# Patient Record
Sex: FEMALE
[De-identification: names, ages, dates, MRNs, and addresses within clinical notes are randomized per-mention and may not be internally consistent; named-entity substitution may affect disease eponyms.]

---

## 2021-06-23 ENCOUNTER — RESULT REVIEW (OUTPATIENT)
Age: 26
End: 2021-06-23

## 2021-07-12 PROBLEM — Z00.00 ENCOUNTER FOR PREVENTIVE HEALTH EXAMINATION: Status: ACTIVE | Noted: 2021-07-12

## 2021-07-13 ENCOUNTER — APPOINTMENT (OUTPATIENT)
Age: 26
End: 2021-07-13

## 2021-07-21 ENCOUNTER — RESULT REVIEW (OUTPATIENT)
Age: 26
End: 2021-07-21

## 2021-07-30 ENCOUNTER — OUTPATIENT (OUTPATIENT)
Dept: OUTPATIENT SERVICES | Facility: HOSPITAL | Age: 26
LOS: 1 days | End: 2021-07-30
Payer: MEDICAID

## 2021-07-30 VITALS
DIASTOLIC BLOOD PRESSURE: 60 MMHG | WEIGHT: 136.03 LBS | SYSTOLIC BLOOD PRESSURE: 100 MMHG | RESPIRATION RATE: 14 BRPM | HEART RATE: 81 BPM | OXYGEN SATURATION: 98 % | HEIGHT: 62 IN | TEMPERATURE: 98 F

## 2021-07-30 DIAGNOSIS — Z98.890 OTHER SPECIFIED POSTPROCEDURAL STATES: Chronic | ICD-10-CM

## 2021-07-30 DIAGNOSIS — N84.0 POLYP OF CORPUS UTERI: ICD-10-CM

## 2021-07-30 LAB
ANION GAP SERPL CALC-SCNC: 14 MMOL/L — SIGNIFICANT CHANGE UP (ref 7–14)
APTT BLD: 32.6 SEC — SIGNIFICANT CHANGE UP (ref 27–36.3)
BUN SERPL-MCNC: 17 MG/DL — SIGNIFICANT CHANGE UP (ref 7–23)
CALCIUM SERPL-MCNC: 9.2 MG/DL — SIGNIFICANT CHANGE UP (ref 8.4–10.5)
CHLORIDE SERPL-SCNC: 103 MMOL/L — SIGNIFICANT CHANGE UP (ref 98–107)
CO2 SERPL-SCNC: 23 MMOL/L — SIGNIFICANT CHANGE UP (ref 22–31)
CREAT SERPL-MCNC: 0.61 MG/DL — SIGNIFICANT CHANGE UP (ref 0.5–1.3)
GLUCOSE SERPL-MCNC: 83 MG/DL — SIGNIFICANT CHANGE UP (ref 70–99)
HCG UR QL: NEGATIVE — SIGNIFICANT CHANGE UP
HCT VFR BLD CALC: 36.7 % — SIGNIFICANT CHANGE UP (ref 34.5–45)
HGB BLD-MCNC: 11.7 G/DL — SIGNIFICANT CHANGE UP (ref 11.5–15.5)
INR BLD: 1.06 RATIO — SIGNIFICANT CHANGE UP (ref 0.88–1.16)
MCHC RBC-ENTMCNC: 28.5 PG — SIGNIFICANT CHANGE UP (ref 27–34)
MCHC RBC-ENTMCNC: 31.9 GM/DL — LOW (ref 32–36)
MCV RBC AUTO: 89.3 FL — SIGNIFICANT CHANGE UP (ref 80–100)
NRBC # BLD: 0 /100 WBCS — SIGNIFICANT CHANGE UP
NRBC # FLD: 0 K/UL — SIGNIFICANT CHANGE UP
PLATELET # BLD AUTO: 340 K/UL — SIGNIFICANT CHANGE UP (ref 150–400)
POTASSIUM SERPL-MCNC: 3.8 MMOL/L — SIGNIFICANT CHANGE UP (ref 3.5–5.3)
POTASSIUM SERPL-SCNC: 3.8 MMOL/L — SIGNIFICANT CHANGE UP (ref 3.5–5.3)
PROTHROM AB SERPL-ACNC: 12.1 SEC — SIGNIFICANT CHANGE UP (ref 10.6–13.6)
RBC # BLD: 4.11 M/UL — SIGNIFICANT CHANGE UP (ref 3.8–5.2)
RBC # FLD: 13 % — SIGNIFICANT CHANGE UP (ref 10.3–14.5)
SODIUM SERPL-SCNC: 140 MMOL/L — SIGNIFICANT CHANGE UP (ref 135–145)
WBC # BLD: 11.31 K/UL — HIGH (ref 3.8–10.5)
WBC # FLD AUTO: 11.31 K/UL — HIGH (ref 3.8–10.5)

## 2021-07-30 PROCEDURE — 93010 ELECTROCARDIOGRAM REPORT: CPT

## 2021-07-30 RX ORDER — SODIUM CHLORIDE 9 MG/ML
3 INJECTION INTRAMUSCULAR; INTRAVENOUS; SUBCUTANEOUS ONCE
Refills: 0 | Status: DISCONTINUED | OUTPATIENT
Start: 2021-08-10 | End: 2021-08-24

## 2021-07-30 RX ORDER — SODIUM CHLORIDE 9 MG/ML
1000 INJECTION, SOLUTION INTRAVENOUS
Refills: 0 | Status: DISCONTINUED | OUTPATIENT
Start: 2021-08-10 | End: 2021-08-24

## 2021-07-30 NOTE — H&P PST ADULT - HISTORY OF PRESENT ILLNESS
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 with subsequent steroid after [facility] w/u , patietnt reports f/u cheat xray noted inflammation of lungs , denies HERNANDEZ     patient reports passing out in march 2021 - unknown cause-  followed by cardiologist and had  loop recorder insertesd , c/o cp , sob with activities , per pt cardiology has cleared from cardiology origin  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 with subsequent steroid after @ [facility]     patient reports passing out in march 2021 - unknown cause-  followed by cardiologist and had  loop recorder insertesd , c/o cp , sob with activities, palpitations are constant , patient had CTA on 7/26/21 and noted to have inflammation in lungs, cardiology normal per pt  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

## 2021-07-30 NOTE — H&P PST ADULT - BIRTH SEX
Problem: Fall Risk (Adult)  Intervention: Safety Precautions  See epic      Comments: See epic  
Problem: Patient Care Overview  Goal: Individualization & Mutuality  See epic    Comments: See epic  
Problem: Patient Care Overview  Goal: Plan of Care Review  Outcome: Ongoing (interventions implemented as appropriate)  Patient arrived to room. PIV placed, labs sent. Admit assessment completed. Plan of care discussed with patient. Will monitor      
Problem: Patient Care Overview  Goal: Plan of Care Review  Outcome: Ongoing (interventions implemented as appropriate)  Report received from CRISTINA Smyth. Patient s/p dccv. Vss. No complaints from patient. Call light in reach. Will monitor.      
Problem: Patient Care Overview  Goal: Plan of Care Review  See epic    Comments: See epic  
Female

## 2021-07-30 NOTE — H&P PST ADULT - RS GEN PE MLT RESP DETAILS PC
breath sounds equal/good air movement/respirations non-labored/clear to auscultation bilaterally/no rhonchi/no wheezes

## 2021-07-30 NOTE — H&P PST ADULT - OTHER CARE PROVIDERS
cardiologist Dr David espinoza [phone] cardiologist Dr David espinoza [phone]       patient to obtain pulmonology pcp referral

## 2021-07-30 NOTE — H&P PST ADULT - GENITOURINARY COMMENTS
painful urination painful urination for a few days states she has not seen her pcp painful urination for a few days and new right flank pain -states she has not seen her pcp

## 2021-07-30 NOTE — H&P PST ADULT - LAST CARDIAC ANGIOGRAM/IMAGING
CTA 2021- normal cardiac eval - noted lung inflammation patient is looking for pulmonologist for further eval CTA 2021- normal per pt - noted lung inflammation patient is looking for pulmonologist for further eval

## 2021-07-30 NOTE — H&P PST ADULT - HEALTH CARE MAINTENANCE
covid vaccine: pfizer 6/19/21, 7/13/21  Denies history of covid infection, recent international travel or exposure to known +covid person

## 2021-07-30 NOTE — H&P PST ADULT - HEMATOLOGY/LYMPHATICS COMMENTS
patient reports strong family hx of DVT- is "constantly getting checked for DVT and has INR monitored" denies any disorder

## 2021-07-30 NOTE — H&P PST ADULT - NSICDXPASTMEDICALHX_GEN_ALL_CORE_FT
PAST MEDICAL HISTORY:  History of tachycardia     Polyp of corpus uteri     Wheat allergy      PAST MEDICAL HISTORY:  ADHD     History of tachycardia     Polyp of corpus uteri     Wheat allergy

## 2021-07-30 NOTE — H&P PST ADULT - ASSESSMENT
Problem: polyp of corpus uteri   Assessment and Plan: Patient scheduled for surgery on 8/10/21  Patient provided with verbal and written presurgical instructions; verbalized understanding  with teach back.    Patient provided with famotidine for GI prophylaxis; verbalized understanding.    Patient confirmed COVID vaccine    Medical  evaluation requested by PST for painful urination and right flank pain , patient verbalized understanding, will obtain  Cardiac evaluation requested by PST for CP, SOB, palpitations , patient verbalized understanding, will obtain  Pulmonology evaluation requested by PST for 'inflammation of lungs' HERNANDEZ , patient verbalized understanding, will obtain  Echo and CTA test requested    Problem: Pre-menopausal   Urine specimen cup provided     patient instructed to stop vitamins and supplements 8/3/21    Problem: ADHD   Plan: hold Vyvanse on DOS

## 2021-07-30 NOTE — H&P PST ADULT - NSICDXFAMILYHX_GEN_ALL_CORE_FT
FAMILY HISTORY:  Father  Still living? No  FH: stroke, Age at diagnosis: Age Unknown    Mother  Still living? No  FH: stroke, Age at diagnosis: Age Unknown  FH: type 2 diabetes, Age at diagnosis: Age Unknown

## 2021-08-06 PROBLEM — Z91.018 ALLERGY TO OTHER FOODS: Chronic | Status: ACTIVE | Noted: 2021-07-30

## 2021-08-06 PROBLEM — F90.9 ATTENTION-DEFICIT HYPERACTIVITY DISORDER, UNSPECIFIED TYPE: Chronic | Status: ACTIVE | Noted: 2021-07-30

## 2021-08-06 PROBLEM — N84.0 POLYP OF CORPUS UTERI: Chronic | Status: ACTIVE | Noted: 2021-07-30

## 2021-08-06 PROBLEM — Z87.898 PERSONAL HISTORY OF OTHER SPECIFIED CONDITIONS: Chronic | Status: ACTIVE | Noted: 2021-07-30

## 2021-08-07 ENCOUNTER — APPOINTMENT (OUTPATIENT)
Dept: DISASTER EMERGENCY | Facility: CLINIC | Age: 26
End: 2021-08-07

## 2021-08-07 DIAGNOSIS — Z01.818 ENCOUNTER FOR OTHER PREPROCEDURAL EXAMINATION: ICD-10-CM

## 2021-08-08 LAB — SARS-COV-2 N GENE NPH QL NAA+PROBE: NOT DETECTED

## 2021-08-09 ENCOUNTER — TRANSCRIPTION ENCOUNTER (OUTPATIENT)
Age: 26
End: 2021-08-09

## 2021-08-10 ENCOUNTER — OUTPATIENT (OUTPATIENT)
Dept: OUTPATIENT SERVICES | Facility: HOSPITAL | Age: 26
LOS: 1 days | Discharge: HOME | End: 2021-08-10
Payer: MEDICAID

## 2021-08-10 ENCOUNTER — RESULT REVIEW (OUTPATIENT)
Age: 26
End: 2021-08-10

## 2021-08-10 VITALS
OXYGEN SATURATION: 100 % | DIASTOLIC BLOOD PRESSURE: 73 MMHG | SYSTOLIC BLOOD PRESSURE: 114 MMHG | RESPIRATION RATE: 18 BRPM | HEART RATE: 69 BPM

## 2021-08-10 VITALS
RESPIRATION RATE: 16 BRPM | HEIGHT: 62 IN | TEMPERATURE: 98 F | WEIGHT: 136.03 LBS | HEART RATE: 76 BPM | DIASTOLIC BLOOD PRESSURE: 69 MMHG | SYSTOLIC BLOOD PRESSURE: 107 MMHG | OXYGEN SATURATION: 100 %

## 2021-08-10 DIAGNOSIS — Z98.890 OTHER SPECIFIED POSTPROCEDURAL STATES: Chronic | ICD-10-CM

## 2021-08-10 DIAGNOSIS — N84.0 POLYP OF CORPUS UTERI: ICD-10-CM

## 2021-08-10 LAB — HCG UR QL: NEGATIVE — SIGNIFICANT CHANGE UP

## 2021-08-10 PROCEDURE — 88305 TISSUE EXAM BY PATHOLOGIST: CPT | Mod: 26

## 2021-08-10 RX ORDER — FAMOTIDINE 10 MG/ML
1 INJECTION INTRAVENOUS
Qty: 0 | Refills: 0 | DISCHARGE

## 2021-08-10 NOTE — ASU DISCHARGE PLAN (ADULT/PEDIATRIC) - ASU DC SPECIAL INSTRUCTIONSFT
After your surgery it is normal to experience:  •	Vaginal bleeding- can last 1-2 weeks and should not be heavier than a period. It may come and go and be red, brown or pink. Use pads, not tampons.  •	Cramping- Is common especially within the first 24 hours. This should be relieved by taking over the counter motrin, advil or Tylenol.  •	Watery discharge- can be seen for a day or two after hysteroscopy because some of the fluid that is put into the uterus during surgery may continue to leak out for a day or two.  •	Vaginal soreness/irritation- can occur in the first few days after surgery because of the instruments that were used in the vagina. Soreness can be treated with ice pack and irritation can be taken care of with an emollient such as balmex or aquaphor that you can put directly on the irritated area.    Restrictions: For 10-14 days after the surgery you should avoid the following:    •	Tampons  •	Sex  •	Vigorous gym exercise  •	Swimming  pools and tub baths  •	Wait a day or two before going back to work    Anesthesia Precautions:  For the next 12 hours do not:   •	drive a car,  •	 operate power tools or machinery,  •	drink alcohol, beer, or wine,   •	make important personal or business decisions    Diet:   •	Resume Regular diet but Progress diet slowly     Physician Notification- Warning signs to look out for  •	Heavy Vaginal Bleeding   •	Shortness of breath or chest pain  •	Severe Abdominal Pain  •	Persistent nausea and vomiting  •	Pain not relieved by medications  •	Fever greater than 100.5®F  •	Inability to tolerate liquids or foods  •	Unable to urinate after 8 hours

## 2021-08-10 NOTE — BRIEF OPERATIVE NOTE - OPERATION/FINDINGS
Findings: Exam under anesthesia revealed a small, anteverted uterus, 6 weeks in size. Cervix dilated to 6.5mm with Radha dilator. Hysteroscopy revealed a 4cmx0.5cm endometrial polyp extending from left corneal segment of uterus to internal cervical os. Ostia located WNL.

## 2021-08-10 NOTE — ASU DISCHARGE PLAN (ADULT/PEDIATRIC) - PROCEDURE
Dilation Curettage Hysteroscopy with Symphion Dilation Curettage Hysteroscopy with Symphion and Endometrial Polyp Removal

## 2021-08-10 NOTE — BRIEF OPERATIVE NOTE - NSICDXBRIEFPOSTOP_GEN_ALL_CORE_FT
POST-OP DIAGNOSIS:  Abnormal uterine bleeding due to endometrial polyp 10-Aug-2021 09:44:39  Tia Mckeon

## 2021-08-10 NOTE — BRIEF OPERATIVE NOTE - COMMENTS
Dictated by Tia Mckeon (confirmation #: [id number])  Fluid deficit from hysteroscope and symphion: 200cc

## 2021-08-10 NOTE — BRIEF OPERATIVE NOTE - NSICDXBRIEFPREOP_GEN_ALL_CORE_FT
PRE-OP DIAGNOSIS:  Abnormal uterine bleeding due to endometrial polyp 10-Aug-2021 09:44:59  Tia Mckeon

## 2021-08-10 NOTE — BRIEF OPERATIVE NOTE - NSICDXBRIEFPROCEDURE_GEN_ALL_CORE_FT
PROCEDURES:  Video hysteroscopy, dilation of cervix with currettage of uterus, and surgical removal of polyp of uterus 10-Aug-2021 09:43:05  Tia Mckeon  Hysteroscopy with dilation and curettage of uterus and use of resectoscope 10-Aug-2021 09:44:04  Tia Mckeon

## 2021-08-10 NOTE — ASU DISCHARGE PLAN (ADULT/PEDIATRIC) - CARE PROVIDER_API CALL
Curtis Villanueva)  Obstetrics and Gynecology  [address] [place] [address]  Phone: [phone]  Fax: [phone]  Follow Up Time:

## 2021-08-10 NOTE — ASU DISCHARGE PLAN (ADULT/PEDIATRIC) - NURSING INSTRUCTIONS
You received IV Tylenol for pain management at 9am. Please DO NOT take any Tylenol (Acetaminophen) containing products, such as Vicodin, Percocet, Excedrin, and cold medications for the next 6 hours (until 3_ PM). DO NOT TAKE MORE THAN 3000 MG OF TYLENOL in a 24 hour period.

## 2021-08-25 LAB — SURGICAL PATHOLOGY STUDY: SIGNIFICANT CHANGE UP

## 2021-09-01 ENCOUNTER — EMERGENCY (EMERGENCY)
Facility: HOSPITAL | Age: 26
LOS: 1 days | Discharge: HOME | End: 2021-09-01
Attending: EMERGENCY MEDICINE | Admitting: EMERGENCY MEDICINE
Payer: MEDICAID

## 2021-09-01 VITALS
SYSTOLIC BLOOD PRESSURE: 123 MMHG | TEMPERATURE: 98 F | DIASTOLIC BLOOD PRESSURE: 80 MMHG | HEIGHT: 62 IN | HEART RATE: 81 BPM | RESPIRATION RATE: 18 BRPM | OXYGEN SATURATION: 97 %

## 2021-09-01 DIAGNOSIS — Z98.890 OTHER SPECIFIED POSTPROCEDURAL STATES: Chronic | ICD-10-CM

## 2021-09-01 PROCEDURE — 99284 EMERGENCY DEPT VISIT MOD MDM: CPT

## 2021-09-01 RX ORDER — SODIUM CHLORIDE 9 MG/ML
1000 INJECTION INTRAMUSCULAR; INTRAVENOUS; SUBCUTANEOUS ONCE
Refills: 0 | Status: COMPLETED | OUTPATIENT
Start: 2021-09-01 | End: 2021-09-01

## 2021-09-01 NOTE — ED ADULT TRIAGE NOTE - CHIEF COMPLAINT QUOTE
Pt st" I had procedure on the August 10th and Im in a lot of pain I can't sit pain in vaginal area."

## 2021-09-02 VITALS
HEART RATE: 70 BPM | TEMPERATURE: 98 F | RESPIRATION RATE: 17 BRPM | OXYGEN SATURATION: 100 % | DIASTOLIC BLOOD PRESSURE: 62 MMHG | SYSTOLIC BLOOD PRESSURE: 100 MMHG

## 2021-09-02 DIAGNOSIS — Z98.890 OTHER SPECIFIED POSTPROCEDURAL STATES: Chronic | ICD-10-CM

## 2021-09-02 LAB
ALBUMIN SERPL ELPH-MCNC: 4.9 G/DL — SIGNIFICANT CHANGE UP (ref 3.3–5)
ALP SERPL-CCNC: 51 U/L — SIGNIFICANT CHANGE UP (ref 40–120)
ALT FLD-CCNC: 12 U/L — SIGNIFICANT CHANGE UP (ref 4–33)
ANION GAP SERPL CALC-SCNC: 17 MMOL/L — HIGH (ref 7–14)
APPEARANCE UR: CLEAR — SIGNIFICANT CHANGE UP
AST SERPL-CCNC: 10 U/L — SIGNIFICANT CHANGE UP (ref 4–32)
BACTERIA # UR AUTO: NEGATIVE — SIGNIFICANT CHANGE UP
BASOPHILS # BLD AUTO: 0.05 K/UL — SIGNIFICANT CHANGE UP (ref 0–0.2)
BASOPHILS NFR BLD AUTO: 0.4 % — SIGNIFICANT CHANGE UP (ref 0–2)
BILIRUB SERPL-MCNC: 0.4 MG/DL — SIGNIFICANT CHANGE UP (ref 0.2–1.2)
BILIRUB UR-MCNC: NEGATIVE — SIGNIFICANT CHANGE UP
BLD GP AB SCN SERPL QL: NEGATIVE — SIGNIFICANT CHANGE UP
BUN SERPL-MCNC: 23 MG/DL — SIGNIFICANT CHANGE UP (ref 7–23)
CALCIUM SERPL-MCNC: 9.5 MG/DL — SIGNIFICANT CHANGE UP (ref 8.4–10.5)
CHLORIDE SERPL-SCNC: 99 MMOL/L — SIGNIFICANT CHANGE UP (ref 98–107)
CO2 SERPL-SCNC: 19 MMOL/L — LOW (ref 22–31)
COLOR SPEC: SIGNIFICANT CHANGE UP
CREAT SERPL-MCNC: 0.64 MG/DL — SIGNIFICANT CHANGE UP (ref 0.5–1.3)
DIFF PNL FLD: ABNORMAL
EOSINOPHIL # BLD AUTO: 0.04 K/UL — SIGNIFICANT CHANGE UP (ref 0–0.5)
EOSINOPHIL NFR BLD AUTO: 0.3 % — SIGNIFICANT CHANGE UP (ref 0–6)
EPI CELLS # UR: 2 /HPF — SIGNIFICANT CHANGE UP (ref 0–5)
GLUCOSE SERPL-MCNC: 115 MG/DL — HIGH (ref 70–99)
GLUCOSE UR QL: NEGATIVE — SIGNIFICANT CHANGE UP
HCG SERPL-ACNC: <5 MIU/ML — SIGNIFICANT CHANGE UP
HCT VFR BLD CALC: 34.8 % — SIGNIFICANT CHANGE UP (ref 34.5–45)
HGB BLD-MCNC: 11.7 G/DL — SIGNIFICANT CHANGE UP (ref 11.5–15.5)
HYALINE CASTS # UR AUTO: 0 /LPF — SIGNIFICANT CHANGE UP (ref 0–7)
IANC: 12.28 K/UL — HIGH (ref 1.5–8.5)
IMM GRANULOCYTES NFR BLD AUTO: 0.4 % — SIGNIFICANT CHANGE UP (ref 0–1.5)
KETONES UR-MCNC: ABNORMAL
LEUKOCYTE ESTERASE UR-ACNC: NEGATIVE — SIGNIFICANT CHANGE UP
LYMPHOCYTES # BLD AUTO: 1.4 K/UL — SIGNIFICANT CHANGE UP (ref 1–3.3)
LYMPHOCYTES # BLD AUTO: 9.9 % — LOW (ref 13–44)
MCHC RBC-ENTMCNC: 29.2 PG — SIGNIFICANT CHANGE UP (ref 27–34)
MCHC RBC-ENTMCNC: 33.6 GM/DL — SIGNIFICANT CHANGE UP (ref 32–36)
MCV RBC AUTO: 86.8 FL — SIGNIFICANT CHANGE UP (ref 80–100)
MONOCYTES # BLD AUTO: 0.28 K/UL — SIGNIFICANT CHANGE UP (ref 0–0.9)
MONOCYTES NFR BLD AUTO: 2 % — SIGNIFICANT CHANGE UP (ref 2–14)
NEUTROPHILS # BLD AUTO: 12.28 K/UL — HIGH (ref 1.8–7.4)
NEUTROPHILS NFR BLD AUTO: 87 % — HIGH (ref 43–77)
NITRITE UR-MCNC: NEGATIVE — SIGNIFICANT CHANGE UP
NRBC # BLD: 0 /100 WBCS — SIGNIFICANT CHANGE UP
NRBC # FLD: 0 K/UL — SIGNIFICANT CHANGE UP
PH UR: 6 — SIGNIFICANT CHANGE UP (ref 5–8)
PLATELET # BLD AUTO: 378 K/UL — SIGNIFICANT CHANGE UP (ref 150–400)
POTASSIUM SERPL-MCNC: 3.9 MMOL/L — SIGNIFICANT CHANGE UP (ref 3.5–5.3)
POTASSIUM SERPL-SCNC: 3.9 MMOL/L — SIGNIFICANT CHANGE UP (ref 3.5–5.3)
PROT SERPL-MCNC: 7.8 G/DL — SIGNIFICANT CHANGE UP (ref 6–8.3)
PROT UR-MCNC: ABNORMAL
RBC # BLD: 4.01 M/UL — SIGNIFICANT CHANGE UP (ref 3.8–5.2)
RBC # FLD: 13.3 % — SIGNIFICANT CHANGE UP (ref 10.3–14.5)
RBC CASTS # UR COMP ASSIST: 3 /HPF — SIGNIFICANT CHANGE UP (ref 0–4)
RH IG SCN BLD-IMP: POSITIVE — SIGNIFICANT CHANGE UP
SODIUM SERPL-SCNC: 135 MMOL/L — SIGNIFICANT CHANGE UP (ref 135–145)
SP GR SPEC: 1.01 — SIGNIFICANT CHANGE UP (ref 1–1.05)
UROBILINOGEN FLD QL: SIGNIFICANT CHANGE UP
WBC # BLD: 14.1 K/UL — HIGH (ref 3.8–10.5)
WBC # FLD AUTO: 14.1 K/UL — HIGH (ref 3.8–10.5)
WBC UR QL: 1 /HPF — SIGNIFICANT CHANGE UP (ref 0–5)

## 2021-09-02 PROCEDURE — 74177 CT ABD & PELVIS W/CONTRAST: CPT | Mod: 26

## 2021-09-02 RX ORDER — MORPHINE SULFATE 50 MG/1
1 CAPSULE, EXTENDED RELEASE ORAL
Qty: 8 | Refills: 0
Start: 2021-09-02

## 2021-09-02 RX ORDER — MORPHINE SULFATE 50 MG/1
4 CAPSULE, EXTENDED RELEASE ORAL ONCE
Refills: 0 | Status: DISCONTINUED | OUTPATIENT
Start: 2021-09-02 | End: 2021-09-02

## 2021-09-02 RX ADMIN — MORPHINE SULFATE 4 MILLIGRAM(S): 50 CAPSULE, EXTENDED RELEASE ORAL at 04:12

## 2021-09-02 RX ADMIN — MORPHINE SULFATE 4 MILLIGRAM(S): 50 CAPSULE, EXTENDED RELEASE ORAL at 00:14

## 2021-09-02 RX ADMIN — SODIUM CHLORIDE 1000 MILLILITER(S): 9 INJECTION INTRAMUSCULAR; INTRAVENOUS; SUBCUTANEOUS at 00:02

## 2021-09-02 NOTE — ED PROVIDER NOTE - PROGRESS NOTE DETAILS
DANG Bhatt: on reassessment patient reports feeling better. labs reviewed, no gross abnormal values demonstrated. CT negative for acute pathology. patient advised on symptoms and instructed to f/u with GYN doctor. return precautions discussed.

## 2021-09-02 NOTE — ED PROVIDER NOTE - NSICDXPASTMEDICALHX_GEN_ALL_CORE_FT
PAST MEDICAL HISTORY:  ADHD     History of tachycardia     Polyp of corpus uteri     Wheat allergy

## 2021-09-02 NOTE — ED PROVIDER NOTE - ATTENDING CONTRIBUTION TO CARE
I have seen and examined the patient on the patient´s visit date. I have reviewed the note written by Renard Bhatt [facility], on that visit day. I have supervised and participated as necessary in the performance of procedures indicated for patient management and was available at all phases of the patient´s visit when needed. We discussed the history, physical exam findings, management plan, and  medical decision making. I have made my additions, exceptions, and revisions within the chart and I agree with H and P as documented in its entirety. The data and my interpretation of any data collected from labs, interventions and imaging appear below as well as my independent medical decision making and considerations    The patient is a 26y Female patient of Dr Villanueva who has a past medical and surgery history of Polyp of corpus uteri cb vaginal bleeding; s/p hysterscopic removal 8/10 tachycardia ADHD History of loop recorder PTED with preineal pain severe enough to make sitting uncomfortable since her surgery prompting multiple visits and imaging for same. In ED patient refusing formal pelvic exam    Vital Signs Stable  PE: as described; my additions and exceptions are noted in the chart  DATA:    RESULTS: All significant/relevant labs and imaging results present during the time of evaluation have been entered into chart through pullset function and are discussed below    MDM:  IMPRESSION: No acute surgical pathology warranting anything other than close followup with PCP for complete exams and further testing as deeemed warranted    PLAN  as above  followup ua cx given leukocytosis

## 2021-09-02 NOTE — ED PROVIDER NOTE - CLINICAL SUMMARY MEDICAL DECISION MAKING FREE TEXT BOX
patient s/p D&C for endometrial polyp presenting with worsening lower abdominal pain radiating to back. patient reports inability to urinate, however patient was able to void to provide urine sample. no red flag sigs appreciated on exam. symptoms concerning for UTI, vs post surgical complications. plan for routine labs, analgesic, CT A/P

## 2021-09-02 NOTE — ED ADULT NURSE NOTE - OBJECTIVE STATEMENT
pt received to , a&ox , ambulatory , pmh of , p/w . Pt breathing even and unlabored on room air. NSR on cardiac monitor. Denies fever, chills, cough, SOB, chest pain, palpitations, dizziness, N/V/D, constipation, numbness, tingling. IV placed. Labs collected and sent. EKG in chart. pending . pt received to , a&ox , ambulatory Patient received for vaginal pain. Verbalized endometrial surgery on 8/10. Endorses pain since surgery. Pain worse at this time. Patient stated she's unable to void since yesterday. Seen and assessed by MD at bedside. Pt breathing even and unlabored on room air.  Denies fever, chills, cough, SOB, chest pain, palpitations, dizziness, N/V/D, constipation, numbness, tingling. 20G IV placed to [facility]. Labs collected and sent. CT scan pending. Stretcher in lowest position, wheels locked, appropriate side rails in place, call bell in reach. pending .

## 2021-09-02 NOTE — ED PROVIDER NOTE - PATIENT PORTAL LINK FT
You can access the FollowMyHealth Patient Portal offered by [facility] by registering at the following website: [email or web address]. By joining [facility]’s FollowMyHealth portal, you will also be able to view your health information using other applications (apps) compatible with our system.

## 2021-09-02 NOTE — ED PROVIDER NOTE - OBJECTIVE STATEMENT
patient is a 24 y/o F hx of tacycardia with loop recorder. s/p D&C for endometrial polyp removal performed by Dr. Curtis Villanueva on Aug 10 presenting with c/o lower abdominal pain radiating to back, pain aggravated with sitting. patient was eval at OSH for same complaint, work included CT of lumbar spine that did not demonstrate any abnormal findings. patient was evaluated at Dr. Villanueva office, placed on abx for possible UTI. patient states that since being evaluated at doctor's office she is unable to urinate. She denies fever, chills, n/v, dysuria, hematuria.

## 2021-09-02 NOTE — ED PROVIDER NOTE - MUSCULOSKELETAL, MLM
+ tenderness to the lumbosacral region. no midline spinal tenderness. 5/5 lower extremity strength, sensation intact b/l, no saddle anesthesia.  Spine appears normal, range of motion is not limited, no muscle or joint tenderness

## 2021-09-03 ENCOUNTER — TRANSCRIPTION ENCOUNTER (OUTPATIENT)
Age: 26
End: 2021-09-03

## 2021-09-03 LAB
CULTURE RESULTS: SIGNIFICANT CHANGE UP
SPECIMEN SOURCE: SIGNIFICANT CHANGE UP

## 2021-09-24 ENCOUNTER — TRANSCRIPTION ENCOUNTER (OUTPATIENT)
Age: 26
End: 2021-09-24

## 2021-11-24 ENCOUNTER — NON-APPOINTMENT (OUTPATIENT)
Age: 26
End: 2021-11-24

## 2021-11-24 ENCOUNTER — APPOINTMENT (OUTPATIENT)
Dept: NEUROLOGY | Facility: CLINIC | Age: 26
End: 2021-11-24
Payer: MEDICAID

## 2021-11-24 VITALS
BODY MASS INDEX: 23.74 KG/M2 | HEIGHT: 63 IN | HEART RATE: 93 BPM | WEIGHT: 134 LBS | DIASTOLIC BLOOD PRESSURE: 76 MMHG | SYSTOLIC BLOOD PRESSURE: 112 MMHG

## 2021-11-24 PROCEDURE — 99205 OFFICE O/P NEW HI 60 MIN: CPT

## 2021-11-24 RX ORDER — FAMOTIDINE 10 MG/1
TABLET, FILM COATED ORAL
Refills: 0 | Status: ACTIVE | COMMUNITY

## 2021-11-24 NOTE — PHYSICAL EXAM
[General Appearance - Alert] : alert [General Appearance - In No Acute Distress] : in no acute distress [Oriented To Time, Place, And Person] : oriented to person, place, and time [Impaired Insight] : insight and judgment were intact [Affect] : the affect was normal [Sclera] : the sclera and conjunctiva were normal [PERRL With Normal Accommodation] : pupils were equal in size, round, reactive to light, with normal accommodation [Extraocular Movements] : extraocular movements were intact [Outer Ear] : the ears and nose were normal in appearance [Oropharynx] : the oropharynx was normal [Neck Appearance] : the appearance of the neck was normal [Neck Cervical Mass (___cm)] : no neck mass was observed [Exaggerated Use Of Accessory Muscles For Inspiration] : no accessory muscle use [Heart Rate And Rhythm] : heart rate was normal and rhythm regular [Edema] : there was no peripheral edema [Abnormal Walk] : normal gait [Nail Clubbing] : no clubbing  or cyanosis of the fingernails [Involuntary Movements] : no involuntary movements were seen [Motor Tone] : muscle strength and tone were normal [Skin Color & Pigmentation] : normal skin color and pigmentation [Skin Turgor] : normal skin turgor [] : no rash [Skin Lesions] : no skin lesions [FreeTextEntry1] : General physical examination:\par \par The patient is well-appearing. VS are stable \par Neurologic examination:\par \par Mental status:\par \par The patient is alert, attentive, and oriented. Speech is clear and fluent with good repetition, comprehension, and naming. \par \par Cranial nerves:\par \par CN II: Visual fields are full to confrontation. \par \par CN III, IV, VI: At primary gaze, there is no eye deviation.EOMI. No ptosis\par \par CN V: Facial sensation is intact bilaterally.\par \par CN VII: Face is symmetric with normal eye closure and smile.\par \par CN VII: Hearing is grossly intact\par \par CN IX, X: Palate elevates symmetrically. Phonation is normal.\par \par CN XI: Head turning and shoulder shrug are intact\par \par CN XII: Tongue is midline with normal movements and no atrophy.\par \par Motor:\par \par There is no pronator drift of out-stretched arms. Muscle bulk and tone are normal. Strength is full bilaterally.\par \par Sensory:\par \par Light touch intact in fingers and toes.\par \par Coordination:\par \par Rapid alternating movements and fine finger movements are intact. There is no dysmetria on finger-to-nose and heel-knee-shin. There are no abnormal or extraneous movements. \par \par Gait/Stance: Within normal limits\par

## 2021-11-24 NOTE — REVIEW OF SYSTEMS
[Anxiety] : anxiety [As Noted in HPI] : as noted in HPI [Shortness Of Breath] : shortness of breath [Negative] : Heme/Lymph [FreeTextEntry6] : Follows with pulmonology- currently tapering off prednisone. When very SOB feels numbness and tingling in both legs, when stops activity numbness goes away [FreeTextEntry1] : Reports allergy to covid vaccine (received in 6/21)- dizziness a few minutes after, hives 4 hours later, treated with prednisone aftre both doses.

## 2021-11-24 NOTE — DISCUSSION/SUMMARY
[Patient encouraged to discuss with Primary MD] : I encouraged the patient to discuss these important issues with ~his/her~ primary care doctor [Goals and Counseling] : I have reviewed the goals of stroke risk factor modification. I counseled the patient on measures to reduce stroke risk, including the importance of medication compliance, risk factor control, exercise, healthy diet and avoidance of smoking. I reviewed stroke warning signs and symptoms and appropriate actions to take if such occur. 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 We discussed that the stereotypical presentation of her events makes the diagnosis of a TIA unlikely. I am recommending she follow up with her rheumatologist to follow up on positive laboratory findings. She endorsed significant anxiety, for which I recommended she consult with psychiatry. She is declining at this time as she states that she sees a therapist regularly and feels she can function well despite her anxiety. I am recommending she consult with neuro ophthalmology; referral provided. She is requesting neurologic clearance to drive. I am waiting for neuro ophthalmology evaluation and will consider EEG monitoring prior to giving official clearance. We discussed signs and symptoms of seizures and strokes at length; she was advised to call 9-1-1 if she experiences any such symptoms. \par \par Josefa TRISTAN and I will discuss case in detail and advise patient of any changes to above plan.
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
## 2021-11-30 ENCOUNTER — APPOINTMENT (OUTPATIENT)
Dept: OPHTHALMOLOGY | Facility: CLINIC | Age: 26
End: 2021-11-30

## 2021-12-02 ENCOUNTER — NON-APPOINTMENT (OUTPATIENT)
Age: 26
End: 2021-12-02

## 2021-12-22 ENCOUNTER — APPOINTMENT (OUTPATIENT)
Dept: ORTHOPEDIC SURGERY | Facility: CLINIC | Age: 26
End: 2021-12-22
Payer: MEDICAID

## 2021-12-22 PROCEDURE — 73110 X-RAY EXAM OF WRIST: CPT | Mod: 50

## 2021-12-22 PROCEDURE — 99203 OFFICE O/P NEW LOW 30 MIN: CPT | Mod: 25

## 2021-12-22 PROCEDURE — 29085 APPL CAST HAND&LWR FOREARM: CPT

## 2021-12-22 PROCEDURE — 73070 X-RAY EXAM OF ELBOW: CPT | Mod: LT

## 2021-12-27 ENCOUNTER — NON-APPOINTMENT (OUTPATIENT)
Age: 26
End: 2021-12-27

## 2021-12-28 ENCOUNTER — APPOINTMENT (OUTPATIENT)
Dept: ORTHOPEDIC SURGERY | Facility: CLINIC | Age: 26
End: 2021-12-28
Payer: MEDICAID

## 2021-12-28 PROCEDURE — ZZZZZ: CPT

## 2021-12-29 ENCOUNTER — APPOINTMENT (OUTPATIENT)
Dept: ORTHOPEDIC SURGERY | Facility: CLINIC | Age: 26
End: 2021-12-29

## 2022-01-03 ENCOUNTER — APPOINTMENT (OUTPATIENT)
Dept: NEUROLOGY | Facility: CLINIC | Age: 27
End: 2022-01-03
Payer: MEDICAID

## 2022-01-03 PROCEDURE — 99214 OFFICE O/P EST MOD 30 MIN: CPT | Mod: 95

## 2022-01-03 RX ORDER — FERROUS SULFATE TAB EC 325 MG (65 MG FE EQUIVALENT) 325 (65 FE) MG
325 (65 FE) TABLET DELAYED RESPONSE ORAL
Qty: 30 | Refills: 0 | Status: ACTIVE | COMMUNITY
Start: 2021-08-02

## 2022-01-03 RX ORDER — PREDNISONE 10 MG/1
10 TABLET ORAL
Refills: 0 | Status: DISCONTINUED | COMMUNITY
End: 2022-01-03

## 2022-01-03 RX ORDER — METHYLPHENIDATE HYDROCHLORIDE 5 MG/1
5 TABLET ORAL
Refills: 0 | Status: DISCONTINUED | COMMUNITY
End: 2022-01-03

## 2022-01-03 RX ORDER — CLONIDINE HYDROCHLORIDE 0.1 MG/1
0.1 TABLET ORAL
Refills: 0 | Status: DISCONTINUED | COMMUNITY
End: 2022-01-03
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
## 2022-01-03 NOTE — PHYSICAL EXAM
[General Appearance - Alert] : alert [General Appearance - In No Acute Distress] : in no acute distress [Impaired Insight] : insight and judgment were intact [Oriented To Time, Place, And Person] : oriented to person, place, and time [Affect] : the affect was normal [Sclera] : the sclera and conjunctiva were normal [PERRL With Normal Accommodation] : pupils were equal in size, round, reactive to light, with normal accommodation [Extraocular Movements] : extraocular movements were intact [Outer Ear] : the ears and nose were normal in appearance [Oropharynx] : the oropharynx was normal [Neck Appearance] : the appearance of the neck was normal [Neck Cervical Mass (___cm)] : no neck mass was observed [Exaggerated Use Of Accessory Muscles For Inspiration] : no accessory muscle use [Heart Rate And Rhythm] : heart rate was normal and rhythm regular [Edema] : there was no peripheral edema [Abnormal Walk] : normal gait [Nail Clubbing] : no clubbing  or cyanosis of the fingernails [Involuntary Movements] : no involuntary movements were seen [Motor Tone] : muscle strength and tone were normal [Skin Color & Pigmentation] : normal skin color and pigmentation [Skin Turgor] : normal skin turgor [] : no rash [Skin Lesions] : no skin lesions [FreeTextEntry1] : This exam reflects the previous neurologic exam. The patient was not able to connect her camera for today's visit, so I was unable to examine her.\par \par General physical examination:\par \par The patient is well-appearing. VS are stable \par Neurologic examination:\par \par Mental status:\par \par The patient is alert, attentive, and oriented. Speech is clear and fluent with good repetition, comprehension, and naming. \par \par Cranial nerves:\par \par CN II: Visual fields are full to confrontation. \par \par CN III, IV, VI: At primary gaze, there is no eye deviation.EOMI. No ptosis\par \par CN V: Facial sensation is intact bilaterally.\par \par CN VII: Face is symmetric with normal eye closure and smile.\par \par CN VII: Hearing is grossly intact\par \par CN IX, X: Palate elevates symmetrically. Phonation is normal.\par \par CN XI: Head turning and shoulder shrug are intact\par \par CN XII: Tongue is midline with normal movements and no atrophy.\par \par Motor:\par \par There is no pronator drift of out-stretched arms. Muscle bulk and tone are normal. Strength is full bilaterally.\par \par Sensory:\par \par Light touch intact in fingers and toes.\par \par Coordination:\par \par Rapid alternating movements and fine finger movements are intact. There is no dysmetria on finger-to-nose and heel-knee-shin. There are no abnormal or extraneous movements. \par \par Gait/Stance: Within normal limits\par

## 2022-01-03 NOTE — REVIEW OF SYSTEMS
[Anxiety] : anxiety [As Noted in HPI] : as noted in HPI [Negative] : Respiratory [de-identified] : She saw hematology in November 2021, and was told she was severely anemic. She has since begun iron therapy 650mg daily; has not had any shortness of breath, numbness, or tingling since beginning iron therapy. Her hematologist felt that her degree of anemia was unlikely to cause this extent of symptoms. The cause of the anemia remains unknown. She is in the process of scheduling appointment with gastroenterology.   [FreeTextEntry1] : Reports allergy to covid vaccine (received in 6/21)- dizziness a few minutes after, hives 4 hours later, treated with prednisone aftre both doses.

## 2022-01-03 NOTE — DISCUSSION/SUMMARY
[Patient encouraged to discuss with Primary MD] : I encouraged the patient to discuss these important issues with ~his/her~ primary care doctor [Goals and Counseling] : I have reviewed the goals of stroke risk factor modification. I counseled the patient on measures to reduce stroke risk, including the importance of medication compliance, risk factor control, exercise, healthy diet and avoidance of smoking. I reviewed stroke warning signs and symptoms and appropriate actions to take if such occur. 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 We discussed that the stereotypical presentation of her events makes the diagnosis of a TIA unlikely. I am recommending she follow up with her rheumatologist to follow up on positive laboratory findings. She endorsed significant anxiety, for which I recommended she consult with psychiatry. She is declining at this time as she states that she sees a therapist regularly and feels she can function well despite her anxiety. I am recommending she consult with neuro ophthalmology; referral provided. She is requesting neurologic clearance to drive. I am waiting for neuro ophthalmology evaluation and will consider EEG monitoring prior to giving official clearance. We discussed signs and symptoms of seizures and strokes at length; she was advised to call 9-1-1 if she experiences any such symptoms. \par \par 1/3/22\par Since last visit, her episodes of one sided weakness and monocular vision loss have not occurred again. However, she has since been experiencing episodes of "spacing out" and had a syncopal event, raising concerns for seizures. We have been in contact over the past few weeks. At the time, I recommended ambulatory EEG monitoring, but she declined as she wanted to have inpatient EEG monitoring. Her EMU stay is still in the process of being arranged. She mentioned that she has a light work schedule this month with many days off; I recommend she complete the ambulatory EEG in the interim. She mentioned rescheduling her appointment with Dr. Zafar on 3 occasions, I encouraged her to keep her upcoming appointment so she can have a full cardiologic workup following her syncopal event. She is scheduled for carotid dopplers and TCD on 1/28/22 to assess for carotid and intracranial stenosis. \par \par \par

## 2022-01-05 ENCOUNTER — OUTPATIENT (OUTPATIENT)
Dept: OUTPATIENT SERVICES | Facility: HOSPITAL | Age: 27
LOS: 1 days | End: 2022-01-05

## 2022-01-05 ENCOUNTER — RESULT REVIEW (OUTPATIENT)
Age: 27
End: 2022-01-05

## 2022-01-05 ENCOUNTER — APPOINTMENT (OUTPATIENT)
Dept: MRI IMAGING | Facility: CLINIC | Age: 27
End: 2022-01-05

## 2022-01-05 ENCOUNTER — APPOINTMENT (OUTPATIENT)
Dept: MRI IMAGING | Facility: CLINIC | Age: 27
End: 2022-01-05
Payer: MEDICAID

## 2022-01-05 ENCOUNTER — APPOINTMENT (OUTPATIENT)
Dept: ORTHOPEDIC SURGERY | Facility: CLINIC | Age: 27
End: 2022-01-05
Payer: MEDICAID

## 2022-01-05 DIAGNOSIS — Z98.890 OTHER SPECIFIED POSTPROCEDURAL STATES: Chronic | ICD-10-CM

## 2022-01-05 DIAGNOSIS — M25.532 PAIN IN LEFT WRIST: ICD-10-CM
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
## 2022-01-05 DIAGNOSIS — S53.442A ULNAR COLLATERAL LIGAMENT SPRAIN OF LEFT ELBOW, INITIAL ENCOUNTER: ICD-10-CM

## 2022-01-05 PROCEDURE — 29085 APPL CAST HAND&LWR FOREARM: CPT

## 2022-01-05 PROCEDURE — 73218 MRI UPPER EXTREMITY W/O DYE: CPT | Mod: 26,LT

## 2022-01-05 PROCEDURE — 73221 MRI JOINT UPR EXTREM W/O DYE: CPT | Mod: 26,LT

## 2022-01-05 PROCEDURE — 99213 OFFICE O/P EST LOW 20 MIN: CPT | Mod: 25

## 2022-01-05 PROCEDURE — 73110 X-RAY EXAM OF WRIST: CPT | Mod: 50

## 2022-01-07 ENCOUNTER — NON-APPOINTMENT (OUTPATIENT)
Age: 27
End: 2022-01-07

## 2022-01-07 ENCOUNTER — APPOINTMENT (OUTPATIENT)
Dept: ORTHOPEDIC SURGERY | Facility: CLINIC | Age: 27
End: 2022-01-07
Payer: MEDICAID

## 2022-01-07 PROCEDURE — ZZZZZ: CPT

## 2022-01-11 ENCOUNTER — NON-APPOINTMENT (OUTPATIENT)
Age: 27
End: 2022-01-11

## 2022-01-18 ENCOUNTER — APPOINTMENT (OUTPATIENT)
Dept: ORTHOPEDIC SURGERY | Facility: CLINIC | Age: 27
End: 2022-01-18
Payer: MEDICAID

## 2022-01-18 DIAGNOSIS — S69.92XD UNSPECIFIED INJURY OF LEFT WRIST, HAND AND FINGER(S), SUBSEQUENT ENCOUNTER: ICD-10-CM

## 2022-01-18 PROCEDURE — 99213 OFFICE O/P EST LOW 20 MIN: CPT

## 2022-01-26 ENCOUNTER — OUTPATIENT (OUTPATIENT)
Dept: OUTPATIENT SERVICES | Facility: HOSPITAL | Age: 27
LOS: 1 days | End: 2022-01-26
Payer: MEDICAID

## 2022-01-26 DIAGNOSIS — Z98.890 OTHER SPECIFIED POSTPROCEDURAL STATES: Chronic | ICD-10-CM

## 2022-01-26 DIAGNOSIS — Z11.52 ENCOUNTER FOR SCREENING FOR COVID-19: ICD-10-CM

## 2022-01-26 PROCEDURE — U0003: CPT

## 2022-01-26 PROCEDURE — U0005: CPT

## 2022-01-26 PROCEDURE — C9803: CPT

## 2022-01-27 LAB — SARS-COV-2 RNA SPEC QL NAA+PROBE: SIGNIFICANT CHANGE UP

## 2022-01-27 NOTE — END OF VISIT
Nutrition Assessment (Disaster Mode)    Type and Reason for Visit: Reassess,Consult    Nutrition Recommendations/Plan:   - Continue current diet order. Assist with meals and encourage po intake. - Continue Magic Cup BID, Ensure Pudding BID, and Ensure Enlive TID  - Continue daily MVI and appetite stimulant. Malnutrition Assessment:  Malnutrition Status: Severe malnutrition    Nutrition Assessment:   Nutrition Assessment: PO intake remains inadequate. Palliative care met with patient and family and goal of care established, noted no feeding tubes desired. Nutrition Related Findings: BM 1/20. Meds: MVI, lantus, SSI, folic acid, marinoll, NS sarah beth 75 mL/hr     Total Energy Requirements (kcals/day): 2155-6377 Energy Requirements Based On: [address] (1.2-1.3) Weight Used for Energy Requirements: Current  Total Protein Requirements (g/day): 71-89 Weight Used for Protein Requirements: Current (0.8-1)  Total Fluid Requirements (ml/day): 9019-5168 Method Used for Fluid Requirements: 1 ml/kcal    Current Nutrition Therapies:  ADULT ORAL NUTRITION SUPPLEMENT Lunch, Breakfast, Dinner; Standard High Calorie/High Protein  ADULT DIET Easy to Chew; Likes applesauce, mashed potatoes, soup. Likes chicken, but without the seasoning. ADULT ORAL NUTRITION SUPPLEMENT Lunch, Dinner; Frozen Supplement  ADULT ORAL NUTRITION SUPPLEMENT Lunch, Breakfast; Fortified Pudding     Anthropometric Measures:  Height: 5' 8\" (172.7 cm) Weight: 89.9 kg (198 lb 3.2 oz) Body mass index is 30.14 kg/m².   Admission Body Weight: 208 lb 15.9 oz  Ideal Body Weight (lbs) (Calculated): 154 lbs Ideal Body Weight (Kg) (Calculated): 70 kg % IBW (Calculated): 143.5 %  Usual Body Weight: 96.2 kg (212 lb) (November 2021) % Weight Change (Calculated): -8            Nutrition Diagnosis:   · Inadequate oral intake related to acute injury/trauma,cognitive or neurological impairment as evidenced by intake 0-25%     Nutrition Interventions:   Food and/or Nutrient Delivery: Continue current diet,Continue oral nutrition supplement,Vitamin supplement,IV fluid delivery  Nutrition Education/Counseling: Education not indicated  Coordination of Nutrition Care: Continue to monitor while inpatient    Previous Goal Met: Progressing toward goal(s)  Goals: PO nutrition intake will meet >75% of patient estimated nutritional needs by next follow up date. Nutrition Monitoring and Evaluation: Patient will be monitored per nutrition standards of care. Consult Dietitian if nutrition intervention essential to patient care is needed.    Behavioral-Environmental Outcomes: None identified  Food/Nutrient Intake Outcomes: Diet advancement/tolerance,Food and nutrient intake,Supplement intake,Vitamin/mineral intake,IVF intake  Physical Signs/Symptoms Outcomes: Biochemical data,Chewing or swallowing,Meal time behavior,Nutrition focused physical findings    Discharge Planning: Continue oral nutrition supplement,Continue current diet    Annalee Rodriges RD on 1/27/2022 at 1:23 PM  Contact: [phone]    Disaster Mode Active [Time Spent: ___ minutes] : I have spent [unfilled] minutes of time on the encounter. [>50% of the face to face encounter time was spent on counseling and/or coordination of care for ___] : Greater than 50% of the face to face encounter time was spent on counseling and/or coordination of care for [unfilled]

## 2022-01-28 ENCOUNTER — INPATIENT (INPATIENT)
Facility: HOSPITAL | Age: 27
LOS: 2 days | Discharge: HOME | DRG: 392 | End: 2022-01-31
Attending: PSYCHIATRY & NEUROLOGY | Admitting: PSYCHIATRY & NEUROLOGY
Payer: MEDICAID

## 2022-01-28 ENCOUNTER — APPOINTMENT (OUTPATIENT)
Dept: NEUROLOGY | Facility: CLINIC | Age: 27
End: 2022-01-28
Payer: MEDICAID

## 2022-01-28 ENCOUNTER — NON-APPOINTMENT (OUTPATIENT)
Age: 27
End: 2022-01-28

## 2022-01-28 VITALS
TEMPERATURE: 98 F | WEIGHT: 138.01 LBS | RESPIRATION RATE: 14 BRPM | HEART RATE: 93 BPM | DIASTOLIC BLOOD PRESSURE: 70 MMHG | OXYGEN SATURATION: 100 % | SYSTOLIC BLOOD PRESSURE: 105 MMHG

## 2022-01-28 DIAGNOSIS — Z98.890 OTHER SPECIFIED POSTPROCEDURAL STATES: Chronic | ICD-10-CM

## 2022-01-28 DIAGNOSIS — G40.909 EPILEPSY, UNSPECIFIED, NOT INTRACTABLE, WITHOUT STATUS EPILEPTICUS: ICD-10-CM

## 2022-01-28 LAB
ALBUMIN SERPL ELPH-MCNC: 4 G/DL — SIGNIFICANT CHANGE UP (ref 3.3–5)
ALP SERPL-CCNC: 58 U/L — SIGNIFICANT CHANGE UP (ref 40–120)
ALT FLD-CCNC: 10 U/L — SIGNIFICANT CHANGE UP (ref 10–45)
AMMONIA BLD-MCNC: 28 UMOL/L — SIGNIFICANT CHANGE UP (ref 11–55)
ANION GAP SERPL CALC-SCNC: 14 MMOL/L — SIGNIFICANT CHANGE UP (ref 5–17)
AST SERPL-CCNC: 12 U/L — SIGNIFICANT CHANGE UP (ref 10–40)
BILIRUB SERPL-MCNC: 0.2 MG/DL — SIGNIFICANT CHANGE UP (ref 0.2–1.2)
BUN SERPL-MCNC: 16 MG/DL — SIGNIFICANT CHANGE UP (ref 7–23)
CALCIUM SERPL-MCNC: 9 MG/DL — SIGNIFICANT CHANGE UP (ref 8.4–10.5)
CHLORIDE SERPL-SCNC: 102 MMOL/L — SIGNIFICANT CHANGE UP (ref 96–108)
CK SERPL-CCNC: 36 U/L — SIGNIFICANT CHANGE UP (ref 25–170)
CO2 SERPL-SCNC: 22 MMOL/L — SIGNIFICANT CHANGE UP (ref 22–31)
CREAT SERPL-MCNC: 0.49 MG/DL — LOW (ref 0.5–1.3)
GLUCOSE SERPL-MCNC: 77 MG/DL — SIGNIFICANT CHANGE UP (ref 70–99)
MAGNESIUM SERPL-MCNC: 2 MG/DL — SIGNIFICANT CHANGE UP (ref 1.6–2.6)
PHOSPHATE SERPL-MCNC: 4.3 MG/DL — SIGNIFICANT CHANGE UP (ref 2.5–4.5)
POTASSIUM SERPL-MCNC: 3.8 MMOL/L — SIGNIFICANT CHANGE UP (ref 3.5–5.3)
POTASSIUM SERPL-SCNC: 3.8 MMOL/L — SIGNIFICANT CHANGE UP (ref 3.5–5.3)
PROT SERPL-MCNC: 6.5 G/DL — SIGNIFICANT CHANGE UP (ref 6–8.3)
SODIUM SERPL-SCNC: 138 MMOL/L — SIGNIFICANT CHANGE UP (ref 135–145)

## 2022-01-28 PROCEDURE — 93880 EXTRACRANIAL BILAT STUDY: CPT

## 2022-01-28 PROCEDURE — 93892 TCD EMBOLI DETECT W/O INJ: CPT

## 2022-01-28 PROCEDURE — 93886 INTRACRANIAL COMPLETE STUDY: CPT

## 2022-01-28 PROCEDURE — 99222 1ST HOSP IP/OBS MODERATE 55: CPT | Mod: GC

## 2022-01-28 RX ORDER — DIPHENHYDRAMINE HCL 50 MG
1 CAPSULE ORAL
Qty: 0 | Refills: 0 | DISCHARGE

## 2022-01-28 RX ORDER — ZINC SULFATE TAB 220 MG (50 MG ZINC EQUIVALENT) 220 (50 ZN) MG
1 TAB ORAL
Qty: 0 | Refills: 0 | DISCHARGE

## 2022-01-28 RX ORDER — LISDEXAMFETAMINE DIMESYLATE 70 MG/1
1 CAPSULE ORAL
Qty: 0 | Refills: 0 | DISCHARGE

## 2022-01-28 RX ORDER — FERROUS SULFATE 325(65) MG
1 TABLET ORAL
Qty: 0 | Refills: 0 | DISCHARGE

## 2022-01-28 RX ORDER — FAMOTIDINE 10 MG/ML
20 INJECTION INTRAVENOUS
Refills: 0 | Status: DISCONTINUED | OUTPATIENT
Start: 2022-01-28 | End: 2022-01-31

## 2022-01-28 RX ORDER — ENOXAPARIN SODIUM 100 MG/ML
40 INJECTION SUBCUTANEOUS DAILY
Refills: 0 | Status: DISCONTINUED | OUTPATIENT
Start: 2022-01-29 | End: 2022-01-31

## 2022-01-28 RX ORDER — DEXAMETHASONE 0.5 MG/5ML
0 ELIXIR ORAL
Qty: 0 | Refills: 0 | DISCHARGE

## 2022-01-28 RX ORDER — GARLIC 1000 MG
1 CAPSULE ORAL
Qty: 0 | Refills: 0 | DISCHARGE

## 2022-01-28 RX ORDER — ZINC SULFATE TAB 220 MG (50 MG ZINC EQUIVALENT) 220 (50 ZN) MG
220 TAB ORAL DAILY
Refills: 0 | Status: DISCONTINUED | OUTPATIENT
Start: 2022-01-28 | End: 2022-01-31

## 2022-01-28 RX ORDER — THIAMINE MONONITRATE (VIT B1) 100 MG
1 TABLET ORAL
Qty: 0 | Refills: 0 | DISCHARGE

## 2022-01-28 RX ORDER — ACETAMINOPHEN 500 MG
2 TABLET ORAL
Qty: 0 | Refills: 0 | DISCHARGE

## 2022-01-28 RX ORDER — RIBOFLAVIN (VITAMIN B2) 25 MG
1 TABLET ORAL
Qty: 0 | Refills: 0 | DISCHARGE

## 2022-01-28 RX ORDER — FERROUS SULFATE 325(65) MG
325 TABLET ORAL
Refills: 0 | Status: DISCONTINUED | OUTPATIENT
Start: 2022-01-28 | End: 2022-01-31

## 2022-01-28 RX ORDER — LACOSAMIDE 50 MG/1
100 TABLET ORAL ONCE
Refills: 0 | Status: DISCONTINUED | OUTPATIENT
Start: 2022-01-28 | End: 2022-01-31

## 2022-01-28 RX ADMIN — FAMOTIDINE 20 MILLIGRAM(S): 10 INJECTION INTRAVENOUS at 22:16

## 2022-01-28 NOTE — H&P ADULT - NSHPPHYSICALEXAM_GEN_ALL_CORE
General:  Constitutional: Obese Female, appears stated age, in no apparent distress including pain  Head: Normocephalic & atraumatic.  ENT: Patent ear canals, intact TM, mucus membranes moist & pink, neck supple, no lymphadenopathy.   Respiratory: Patent airway. All lung fields are clear to auscultation bilaterally.  Extremities: No cyanosis, clubbing, or edema.  Skin: No rashes, bruising, or discoloration.    Cardiovascular (>2): RRR no murmurs. Carotid pulsations symmetric, no bruits. Normal capillary beds refill, 1-2 seconds or less.     Neurological (>12):  MS: Awake, alert, oriented to person, place, situation, time. Normal affect. Follows all commands.    Language: Speech is clear, fluent with good repetition & comprehension (able to name objects___)    CNs: PERRLA (R = 3mm, L = 3mm). VFF. EOMI no nystagmus, no diplopia. V1-3 intact to LT/pinprick, well developed masseter muscles b/l. No facial asymmetry b/l, full eye closure strength b/l. Hearing grossly normal (rubbing fingers) b/l. Symmetric palate elevation in midline. Gag reflex deferred. Head turning & shoulder shrug intact b/l. Tongue midline, normal movements, no atrophy.    Fundoscopic: pale w/ sharp discs margins No vascular changes.      Motor: Normal muscle bulk & tone. No noticeable tremor or seizure. No pronator drift.              Deltoid	Biceps	Triceps	Wrist	Finger ABd	   R	5	5	5	5	5		5 	  L	5	5	5	5	5		5    	H-Flex	H-Ext	H-ABd	H-ADd	K-Flex	K-Ext	D-Flex	P-Flex  R	5	5	5	5	5	5	5	5 	   L	5	5	5	5	5	5	5	5	     Sensation: Intact to LT/PP/Temp/Vibration/Position b/l throughout.     Cortical: Extinction on DSS (neglect): none    Reflexes:              Biceps(C5)       BR(C6)     Triceps(C7)               Patellar(L4)    Achilles(S1)    Plantar Resp  R	2	          2	             2		        2		    2		Down   L	2	          2	             2		        2		    2		Down     Coordination: intact rapid-alt movements. No dysmetria to FTN/HTS    Gait: Normal Romberg. No postural instability. Normal stance and tandem gait. General:  Constitutional: Appears stated age, in no apparent distress including pain  Head: Normocephalic & atraumatic.    Neurological (>12):  MS: Awake, alert, oriented to person, place, situation, time. Normal affect. Follows all commands.    Language: Speech is clear, fluent with good comprehension    CNs: PERRLA (R = 4mm, L = 4mm). VF intact in all 4 quadrants. EOMI no nystagmus. V1-3 intact to LT, well developed masseter muscles b/l. No facial asymmetry b/l, full eye closure strength b/l. Hearing grossly normal (rubbing fingers) b/l. Symmetric palate elevation in midline. Shoulder shrug intact b/l. Tongue midline, normal movements, no atrophy.    Motor: Normal muscle bulk & tone. No noticeable tremor or seizure. No pronator drift. L wrist in splint.              Deltoid	Biceps	Triceps	   R	5	5	5	5	 	  L	5	5	5	4    	H-Flex	H-Ext	K-Flex	K-Ext	D-Flex	P-Flex  R	5	5	5	5	5	5		   L	5	5	5	5	5	5	     Sensation: Intact to LT b/l throughout.     Reflexes:              Biceps(C5)       BR(C6)     Triceps(C7)               Patellar(L4)    Achilles(S1)     R	1	          1	             1		        1		    1		  L	1	          1	             1		        1		    1		    Coordination: No dysmetria to FTN
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
## 2022-01-28 NOTE — PATIENT PROFILE ADULT - NSPROMEDSDISPOSITION_GEN_A_NUR
Caller: patient  pregnancy   Details of condition: Patient started having brown spotting at 6 weeks that stopped, but it has now started again twice last night and this morning.  Pharmacy verified? No  Appointment offered? No  Best time to reach patient:  any  Patient would like a call back at  [phone]                bedside

## 2022-01-28 NOTE — H&P ADULT - NSHPSOCIALHISTORY_GEN_ALL_CORE
SOCIAL HISTORY:   T/E/D:   Occupation:   Lives with: SOCIAL HISTORY:   T/E/D: no tobacco, illicit drugs  Occupation: student

## 2022-01-28 NOTE — H&P ADULT - ATTENDING COMMENTS
completely agree with this extremely complex history ( congratulations to the resident that took it)  events stereotype, likely epileptic in nature.  VEEG monitoring for event characterization and localization  may need additional autoimmune workup  no AEDs for now  rescue ativan/vimpat only for convulsions

## 2022-01-28 NOTE — H&P ADULT - ASSESSMENT
Jeniffer Lam is a 26 year old RH female with past medical history of transient neurological events currently not well explained presenting for EMU admission and evaluation.    Impression: event capture and characterization     Semiology:   1) R eye vision loss and L sided weakness   2) Spacing out episodes which may be accompanied by syncope     Plan:   [] admit to EMU   [] vEEG   [] no ASMs for now   [] CBC, CMP, Mg, P, CK, UA, Utox, ammonia   [] neurochecks   [] telemetry  [] DVT PPx with Lovenox 40mg subq daily   [] will f/u outpatient upon discharge with epilepsy neurology, Dr. Phoenix  [] advised for now not to drive, operate heavy machinery, avoid heights, pools, bathtubs.    RESCUE: Ativan 1mg IV and 2nd line Vimpat 100mg     Case to be seen and discussed with neurology attending, Dr. Phoenix.

## 2022-01-29 LAB
APPEARANCE UR: CLEAR — SIGNIFICANT CHANGE UP
BILIRUB UR-MCNC: NEGATIVE — SIGNIFICANT CHANGE UP
COLOR SPEC: ABNORMAL
DIFF PNL FLD: NEGATIVE — SIGNIFICANT CHANGE UP
GLUCOSE UR QL: NEGATIVE — SIGNIFICANT CHANGE UP
KETONES UR-MCNC: NEGATIVE — SIGNIFICANT CHANGE UP
LEUKOCYTE ESTERASE UR-ACNC: NEGATIVE — SIGNIFICANT CHANGE UP
NITRITE UR-MCNC: NEGATIVE — SIGNIFICANT CHANGE UP
PH UR: 6 — SIGNIFICANT CHANGE UP (ref 5–8)
PROLACTIN SERPL-MCNC: 19.6 NG/ML — SIGNIFICANT CHANGE UP (ref 3.4–24.1)
PROT UR-MCNC: NEGATIVE — SIGNIFICANT CHANGE UP
SP GR SPEC: 1.02 — SIGNIFICANT CHANGE UP (ref 1.01–1.02)
TSH SERPL-MCNC: 1.41 UIU/ML — SIGNIFICANT CHANGE UP (ref 0.27–4.2)
UROBILINOGEN FLD QL: NEGATIVE — SIGNIFICANT CHANGE UP

## 2022-01-29 PROCEDURE — 99233 SBSQ HOSP IP/OBS HIGH 50: CPT | Mod: 1L

## 2022-01-29 PROCEDURE — 95720 EEG PHY/QHP EA INCR W/VEEG: CPT

## 2022-01-29 NOTE — PROGRESS NOTE ADULT - ASSESSMENT
Jeniffer Lam is a 26 year old RH female with past medical history of transient neurological events currently not well explained presenting for EMU admission and evaluation.    Impression: event capture and characterization     Semiology:   1) R eye vision loss and L sided weakness   2) Spacing out episodes which may be accompanied by syncope     Plan:   []   [] vEEG w mild generalized slowing, unclear etiology. will continue monitoring . consider Lp for AIE workup if EEG will show focality  [] no ASMs for now   [] CBC, CMP, Mg, P, CK, UA, Utox, ammonia   [] neurochecks   [] telemetry  [] DVT PPx with Lovenox 40mg subq daily   [] will f/u outpatient upon discharge with epilepsy neurology, Dr. Phoenix  [] advised for now not to drive, operate heavy machinery, avoid heights, pools, bathtubs.    RESCUE: Ativan 1mg IV and 2nd line Vimpat 100mg     Case to be seen and discussed with neurology attending, Dr. Phoenix.

## 2022-01-29 NOTE — EEG REPORT - NS EEG TEXT BOX
[facility]   COMPREHENSIVE EPILEPSY CENTER   REPORT OF CONTINUOUS VIDEO EEG     [facility]: [address] , [address], [place] [address], Ph#: [phone]  [facility]: [address], [place] [address], Ph#: [phone]  Office: [address] [place] [address], [facility] [address], [place] [address] Ph#: [phone]    Patient Name: ARIANE HAWKINS  Age and : 26y ([birth year])  MRN #: [id number]  Location: [address]  Referring Physician: Teresita Phoenix    Study Date: 22-22 18:00-08:00 14 hours    _____________________________________________________________  STUDY INFORMATION    EEG Recording Technique:  The patient underwent continuous Video-EEG monitoring, using Telemetry System hardware on the XLTek Digital System. EEG and video data were stored on a computer hard drive with important events saved in digital archive files. The material was reviewed by a physician (electroencephalographer / epileptologist) on a daily basis. Donnie and seizure detection algorithms were utilized and reviewed. An EEG Technician attended to the patient, and was available throughout daytime work hours.  The epilepsy center neurologist was available in person or on call 24-hours per day.    EEG Placement and Labeling of Electrodes:  The EEG was performed utilizing 20 channel referential EEG connections (coronal over temporal over parasagittal montage) using all standard 10-20 electrode placements with EKG, with additional electrodes placed in the inferior temporal region using the modified 10-10 montage electrode placements for elective admissions, or if deemed necessary. Recording was at a sampling rate of 256 samples per second per channel. Time synchronized digital video recording was done simultaneously with EEG recording. A low light infrared camera was used for low light recording.     _____________________________________________________________  HISTORY    Patient is a 26y old  Female who presents with a chief complaint of AMS    PERTINENT MEDICATION:  MEDICATIONS  (STANDING):  enoxaparin Injectable 40 milliGRAM(s) SubCutaneous daily  famotidine    Tablet 20 milliGRAM(s) Oral two times a day  ferrous    sulfate 325 milliGRAM(s) Oral two times a day  zinc sulfate 220 milliGRAM(s) Oral daily    _____________________________________________________________  INTERPRETATION    Findings: The background was continuous, spontaneously variable and reactive. During wakefulness, the posterior dominant rhythm consisted of symmetric, well-modulated 9 Hz activity, with amplitude to 30 uV, that attenuated to eye opening.  Low amplitude frontal beta was noted in wakefulness.    Background Slowing:  Intermittent  theta slowing.    Focal Slowing:   None were present.    Sleep Background:  Drowsiness was characterized by fragmentation, attenuation, and slowing of the background activity.    Sleep was characterized by the presence of vertex waves, symmetric sleep spindles and K-complexes.    Other Non-Epileptiform Findings:  None were present.    Interictal Epileptiform Activity:   None were present.    Events:  Clinical events: None recorded.  Seizures: None recorded.    Artifacts:  Intermittent myogenic and movement artifacts were noted.    ECG:  The heart rate on single channel ECG was predominantly between 60-70 BPM.    _____________________________________________________________  EEG SUMMARY/CLASSIFICATION    Abnormal EEG in the awake, drowsy and asleep states.  - Mild generalized slowing.    _____________________________________________________________  EEG IMPRESSION/CLINICAL CORRELATE    Abnormal EEG study.  Mild nonspecific diffuse or multifocal cerebral dysfunction.   No epileptiform pattern or seizure seen.          Teresita Phoenix MD  Epilepsy Attending, [facility] Epilepsy [place]

## 2022-01-30 LAB
ANION GAP SERPL CALC-SCNC: 13 MMOL/L — SIGNIFICANT CHANGE UP (ref 5–17)
BUN SERPL-MCNC: 15 MG/DL — SIGNIFICANT CHANGE UP (ref 7–23)
CALCIUM SERPL-MCNC: 9 MG/DL — SIGNIFICANT CHANGE UP (ref 8.4–10.5)
CHLORIDE SERPL-SCNC: 101 MMOL/L — SIGNIFICANT CHANGE UP (ref 96–108)
CO2 SERPL-SCNC: 21 MMOL/L — LOW (ref 22–31)
CREAT SERPL-MCNC: 0.48 MG/DL — LOW (ref 0.5–1.3)
GLUCOSE SERPL-MCNC: 88 MG/DL — SIGNIFICANT CHANGE UP (ref 70–99)
HCG UR QL: NEGATIVE — SIGNIFICANT CHANGE UP
HCG UR QL: NEGATIVE — SIGNIFICANT CHANGE UP
HCT VFR BLD CALC: 37 % — SIGNIFICANT CHANGE UP (ref 34.5–45)
HGB BLD-MCNC: 12.1 G/DL — SIGNIFICANT CHANGE UP (ref 11.5–15.5)
MCHC RBC-ENTMCNC: 28.8 PG — SIGNIFICANT CHANGE UP (ref 27–34)
MCHC RBC-ENTMCNC: 32.7 GM/DL — SIGNIFICANT CHANGE UP (ref 32–36)
MCV RBC AUTO: 88.1 FL — SIGNIFICANT CHANGE UP (ref 80–100)
NRBC # BLD: 0 /100 WBCS — SIGNIFICANT CHANGE UP (ref 0–0)
PLATELET # BLD AUTO: 372 K/UL — SIGNIFICANT CHANGE UP (ref 150–400)
POTASSIUM SERPL-MCNC: 4 MMOL/L — SIGNIFICANT CHANGE UP (ref 3.5–5.3)
POTASSIUM SERPL-SCNC: 4 MMOL/L — SIGNIFICANT CHANGE UP (ref 3.5–5.3)
RBC # BLD: 4.2 M/UL — SIGNIFICANT CHANGE UP (ref 3.8–5.2)
RBC # FLD: 12.8 % — SIGNIFICANT CHANGE UP (ref 10.3–14.5)
SARS-COV-2 RNA SPEC QL NAA+PROBE: SIGNIFICANT CHANGE UP
SODIUM SERPL-SCNC: 135 MMOL/L — SIGNIFICANT CHANGE UP (ref 135–145)
WBC # BLD: 11.7 K/UL — HIGH (ref 3.8–10.5)
WBC # FLD AUTO: 11.7 K/UL — HIGH (ref 3.8–10.5)

## 2022-01-30 PROCEDURE — 95720 EEG PHY/QHP EA INCR W/VEEG: CPT

## 2022-01-30 PROCEDURE — 99233 SBSQ HOSP IP/OBS HIGH 50: CPT | Mod: 1L

## 2022-01-30 RX ADMIN — FAMOTIDINE 20 MILLIGRAM(S): 10 INJECTION INTRAVENOUS at 11:24

## 2022-01-30 RX ADMIN — Medication 325 MILLIGRAM(S): at 11:24

## 2022-01-30 RX ADMIN — FAMOTIDINE 20 MILLIGRAM(S): 10 INJECTION INTRAVENOUS at 17:41

## 2022-01-30 RX ADMIN — ZINC SULFATE TAB 220 MG (50 MG ZINC EQUIVALENT) 220 MILLIGRAM(S): 220 (50 ZN) TAB at 11:24

## 2022-01-30 RX ADMIN — Medication 325 MILLIGRAM(S): at 17:41

## 2022-01-30 NOTE — PROGRESS NOTE ADULT - ASSESSMENT
26 year old RH female with past medical history of transient neurological events currently not well explained presenting for EMU admission and evaluation.    Impression: Stereotypical events, high suspicion that they are epileptic in nature, awaiting event capture and characterization; cannot rule out autoimmune etiology     Semiology:   1) R eye vision loss and L sided weakness   2) Spacing out episodes which may be accompanied by syncope     Plan:   [] vEEG   [] hold anti-seizure meds for now   [] neurochecks Q4h  [] telemetry  [] DVT PPx with Lovenox 40mg subq daily   [] will f/u outpatient upon discharge with epilepsy neurology, Dr. Phoenix  [] advised for now not to drive, operate heavy machinery, avoid heights, pools, bathtubs.    RESCUE: Ativan 1mg IV and 2nd line Vimpat 100mg     Patient seen and discussed with neurology attending, Dr. Phoenix.  26 year old RH female with past medical history of transient neurological events currently not well explained presenting for EMU admission and evaluation.    Impression: Stereotypical events, high suspicion that they are epileptic in nature, awaiting event capture and characterization; cannot rule out autoimmune etiology     Semiology:   1) R eye vision loss and L sided weakness   2) Spacing out episodes which may be accompanied by syncope     Plan:   [] vEEG   [] hold anti-seizure meds for now   [] NPO at midnight for procedure  [] EGD with GIDr. Cote tomorrow  [] Neurochecks Q4h  [] Telemetry  [] DVT PPx with Lovenox 40mg subq daily   [] will f/u outpatient upon discharge with epilepsy neurology, Dr. Phoenix  [] advised for now not to drive, operate heavy machinery, avoid heights, pools, bathtubs.    RESCUE: Ativan 1mg IV and 2nd line Vimpat 100mg     Patient seen and discussed with neurology attending, Dr. Phoenix.

## 2022-01-30 NOTE — PROVIDER CONTACT NOTE (MEDICATION) - BACKGROUND
Pt rcvd to RW from previous shift, pt started, has IV to R AC #20g.  C/o L sided abd pain.  Plans for admission for Diverticulitis.  Medicated for pain as ordered.  Awaiting further orders. Vitally stable, ambulatory, appearing in no acute distress.  Will ctm.  Updated pt to plan of care. Pt 26yr old female admitted for VEEG to r/o seizures.

## 2022-01-30 NOTE — CHART NOTE - NSCHARTNOTEFT_GEN_A_CORE
Full note to follow, tentatively for EGD tomorrow with small intestinal biopsies, celiac serologies ordered

## 2022-01-30 NOTE — EEG REPORT - NS EEG TEXT BOX
[facility]   COMPREHENSIVE EPILEPSY CENTER   REPORT OF CONTINUOUS VIDEO EEG     [facility]: [address] [facility] [facility] [address] , [address], [place] [address], Ph#: [phone]  [facility]: [address], [place] [address], Ph#: [phone]  Office: [address] [place] [address], [facility] [address], [place] [address] Ph#: [phone]    Patient Name: ARIANE HAWKINS  Age and : 26y ([birth year])  MRN #: [id number]  Location: [address]  Referring Physician: Teresita Phoenix    Study Date: 22-22 08:00-08:00 24 hours    _____________________________________________________________  STUDY INFORMATION    EEG Recording Technique:  The patient underwent continuous Video-EEG monitoring, using Telemetry System hardware on the XLTek Digital System. EEG and video data were stored on a computer hard drive with important events saved in digital archive files. The material was reviewed by a physician (electroencephalographer / epileptologist) on a daily basis. Donnie and seizure detection algorithms were utilized and reviewed. An EEG Technician attended to the patient, and was available throughout daytime work hours.  The epilepsy center neurologist was available in person or on call 24-hours per day.    EEG Placement and Labeling of Electrodes:  The EEG was performed utilizing 20 channel referential EEG connections (coronal over temporal over parasagittal montage) using all standard 10-20 electrode placements with EKG, with additional electrodes placed in the inferior temporal region using the modified 10-10 montage electrode placements for elective admissions, or if deemed necessary. Recording was at a sampling rate of 256 samples per second per channel. Time synchronized digital video recording was done simultaneously with EEG recording. A low light infrared camera was used for low light recording.     _____________________________________________________________  HISTORY    Patient is a 26y old  Female who presents with a chief complaint of AMS    PERTINENT MEDICATION:  MEDICATIONS  (STANDING):  enoxaparin Injectable 40 milliGRAM(s) SubCutaneous daily  famotidine    Tablet 20 milliGRAM(s) Oral two times a day  ferrous    sulfate 325 milliGRAM(s) Oral two times a day  zinc sulfate 220 milliGRAM(s) Oral daily    _____________________________________________________________  INTERPRETATION    Findings: The background was continuous, spontaneously variable and reactive. During wakefulness, the posterior dominant rhythm consisted of symmetric, well-modulated 9 Hz activity, with amplitude to 30 uV, that attenuated to eye opening.  Low amplitude frontal beta was noted in wakefulness.    Background Slowing:  Intermittent  theta slowing.    Focal Slowing:   None were present.    Sleep Background:  Drowsiness was characterized by fragmentation, attenuation, and slowing of the background activity.    Sleep was characterized by the presence of vertex waves, symmetric sleep spindles and K-complexes.    Other Non-Epileptiform Findings:  None were present.    Interictal Epileptiform Activity:   None were present.    Events:  Clinical events: None recorded.  Seizures: None recorded.    Artifacts:  Intermittent myogenic and movement artifacts were noted.    ECG:  The heart rate on single channel ECG was predominantly between 60-70 BPM.    _____________________________________________________________  EEG SUMMARY/CLASSIFICATION    Abnormal EEG in the awake, drowsy and asleep states.  - Mild generalized slowing.    _____________________________________________________________  EEG IMPRESSION/CLINICAL CORRELATE    Abnormal EEG study.  Mild nonspecific diffuse or multifocal cerebral dysfunction.   No epileptiform pattern or seizure seen.          Teresita Phoenix MD  Epilepsy Attending, [facility] Epilepsy [place]

## 2022-01-30 NOTE — PROVIDER CONTACT NOTE (MEDICATION) - ACTION/TREATMENT ORDERED:
Pt not allowed to take home medications. Pt informed. Refuses to take hospital medications. MD will speak to pt.

## 2022-01-30 NOTE — PROGRESS NOTE ADULT - SUBJECTIVE AND OBJECTIVE BOX
Neurology Progress Note    Interval History - No events overnight    Subjective:  Patient was seen and examined at bedside. She reports taking her own vitamins, she was instructed that all meds would be administered in the hospital.    Objective:   Vital Signs Last 24 Hrs  T(C): 36.5 (30 Jan 2022 08:08), Max: 37.4 (30 Jan 2022 05:11)  T(F): 97.7 (30 Jan 2022 08:08), Max: 99.3 (30 Jan 2022 05:11)  HR: 70 (30 Jan 2022 08:08) (70 - 91)  BP: 95/64 (30 Jan 2022 08:08) (91/59 - 101/65)  BP(mean): --  RR: 17 (30 Jan 2022 08:08) (16 - 18)  SpO2: 99% (30 Jan 2022 08:08) (97% - 99%)    General Exam:   PHYSICAL EXAM:  GENERAL: NAD  HEENT: Normocephalic;  conjunctivae and sclerae clear; moist mucous membranes  NECK: Supple  EXTREMITIES: No cyanosis; no edema; no calf tenderness  SKIN: Warm and dry; no rash    NEURO EXAM:  MS: Awake, alert, oriented to person, place, time. Normal affect. Follows all commands.    Language: Speech is clear, fluent with good comprehension     CNs: eyes moving spontaneously in all directions. well developed masseter muscles b/l. No facial asymmetry b/l. Symmetric palate elevation in midline. Gag reflex deferred. Tongue midline, normal movements, no atrophy.    Motor: Normal muscle bulk & tone. No noticeable tremor or seizure. No pronator drift.  Moving all extremities equally without laterality    Other:    01-30    135  |  101  |  15  ----------------------------<  88  4.0   |  21<L>  |  0.48<L>    Ca    9.0      30 Jan 2022 06:45  Phos  4.3     01-28  Mg     2.0     01-28    TPro  6.5  /  Alb  4.0  /  TBili  0.2  /  DBili  x   /  AST  12  /  ALT  10  /  AlkPhos  58  01-28    LIVER FUNCTIONS - ( 28 Jan 2022 22:25 )  Alb: 4.0 g/dL / Pro: 6.5 g/dL / ALK PHOS: 58 U/L / ALT: 10 U/L / AST: 12 U/L / GGT: x                                 12.1   11.70 )-----------( 372      ( 30 Jan 2022 06:45 )             37.0     EEG  EEG SUMMARY/CLASSIFICATION    Abnormal EEG in the awake, drowsy and asleep states.  - Mild generalized slowing.    _____________________________________________________________  EEG IMPRESSION/CLINICAL CORRELATE    Abnormal EEG study.  Mild nonspecific diffuse or multifocal cerebral dysfunction.   No epileptiform pattern or seizure seen.      MEDICATIONS  (STANDING):  enoxaparin Injectable 40 milliGRAM(s) SubCutaneous daily  famotidine    Tablet 20 milliGRAM(s) Oral two times a day  ferrous    sulfate 325 milliGRAM(s) Oral two times a day  zinc sulfate 220 milliGRAM(s) Oral daily    MEDICATIONS  (PRN):  lacosamide Injectable 100 milliGRAM(s) IV Push once PRN Seizure  LORazepam   Injectable 1 milliGRAM(s) IV Push once PRN Seizure activity

## 2022-01-30 NOTE — PROVIDER CONTACT NOTE (MEDICATION) - SITUATION
Pt refusing to take hospital medications. States she has her home medications on hand and prefers to take them. Pt already taken home medications. States she has spoken to the doctors about this. Refusing lovenox as she has a bleeding disorder.

## 2022-01-31 ENCOUNTER — TRANSCRIPTION ENCOUNTER (OUTPATIENT)
Age: 27
End: 2022-01-31

## 2022-01-31 ENCOUNTER — RESULT REVIEW (OUTPATIENT)
Age: 27
End: 2022-01-31

## 2022-01-31 VITALS
OXYGEN SATURATION: 99 % | SYSTOLIC BLOOD PRESSURE: 96 MMHG | TEMPERATURE: 98 F | DIASTOLIC BLOOD PRESSURE: 57 MMHG | RESPIRATION RATE: 18 BRPM | HEART RATE: 79 BPM

## 2022-01-31 LAB
ANION GAP SERPL CALC-SCNC: 10 MMOL/L — SIGNIFICANT CHANGE UP (ref 5–17)
BUN SERPL-MCNC: 14 MG/DL — SIGNIFICANT CHANGE UP (ref 7–23)
CALCIUM SERPL-MCNC: 8.9 MG/DL — SIGNIFICANT CHANGE UP (ref 8.4–10.5)
CHLORIDE SERPL-SCNC: 101 MMOL/L — SIGNIFICANT CHANGE UP (ref 96–108)
CO2 SERPL-SCNC: 22 MMOL/L — SIGNIFICANT CHANGE UP (ref 22–31)
COVID-19 NUCLEOCAPSID GAM AB INTERP: NEGATIVE — SIGNIFICANT CHANGE UP
COVID-19 NUCLEOCAPSID TOTAL GAM ANTIBODY RESULT: 0.07 INDEX — SIGNIFICANT CHANGE UP
COVID-19 SPIKE DOMAIN AB INTERP: POSITIVE
COVID-19 SPIKE DOMAIN ANTIBODY RESULT: 198 U/ML — HIGH
CREAT SERPL-MCNC: 0.53 MG/DL — SIGNIFICANT CHANGE UP (ref 0.5–1.3)
GLUCOSE SERPL-MCNC: 84 MG/DL — SIGNIFICANT CHANGE UP (ref 70–99)
HCT VFR BLD CALC: 37.8 % — SIGNIFICANT CHANGE UP (ref 34.5–45)
HGB BLD-MCNC: 12.5 G/DL — SIGNIFICANT CHANGE UP (ref 11.5–15.5)
IGA FLD-MCNC: 129 MG/DL — SIGNIFICANT CHANGE UP (ref 84–499)
IGG FLD-MCNC: 1030 MG/DL — SIGNIFICANT CHANGE UP (ref 610–1660)
IGM SERPL-MCNC: 114 MG/DL — SIGNIFICANT CHANGE UP (ref 35–242)
KAPPA LC SER QL IFE: 1.74 MG/DL — SIGNIFICANT CHANGE UP (ref 0.33–1.94)
KAPPA/LAMBDA FREE LIGHT CHAIN RATIO, SERUM: 1.09 RATIO — SIGNIFICANT CHANGE UP (ref 0.26–1.65)
LAMBDA LC SER QL IFE: 1.6 MG/DL — SIGNIFICANT CHANGE UP (ref 0.57–2.63)
MCHC RBC-ENTMCNC: 29.1 PG — SIGNIFICANT CHANGE UP (ref 27–34)
MCHC RBC-ENTMCNC: 33.1 GM/DL — SIGNIFICANT CHANGE UP (ref 32–36)
MCV RBC AUTO: 88.1 FL — SIGNIFICANT CHANGE UP (ref 80–100)
NRBC # BLD: 0 /100 WBCS — SIGNIFICANT CHANGE UP (ref 0–0)
PLATELET # BLD AUTO: 355 K/UL — SIGNIFICANT CHANGE UP (ref 150–400)
POTASSIUM SERPL-MCNC: 3.9 MMOL/L — SIGNIFICANT CHANGE UP (ref 3.5–5.3)
POTASSIUM SERPL-SCNC: 3.9 MMOL/L — SIGNIFICANT CHANGE UP (ref 3.5–5.3)
RBC # BLD: 4.29 M/UL — SIGNIFICANT CHANGE UP (ref 3.8–5.2)
RBC # FLD: 12.9 % — SIGNIFICANT CHANGE UP (ref 10.3–14.5)
SARS-COV-2 IGG+IGM SERPL QL IA: 0.07 INDEX — SIGNIFICANT CHANGE UP
SARS-COV-2 IGG+IGM SERPL QL IA: 198 U/ML — HIGH
SARS-COV-2 IGG+IGM SERPL QL IA: NEGATIVE — SIGNIFICANT CHANGE UP
SARS-COV-2 IGG+IGM SERPL QL IA: POSITIVE
SODIUM SERPL-SCNC: 133 MMOL/L — LOW (ref 135–145)
WBC # BLD: 10.96 K/UL — HIGH (ref 3.8–10.5)
WBC # FLD AUTO: 10.96 K/UL — HIGH (ref 3.8–10.5)

## 2022-01-31 PROCEDURE — 95715 VEEG EA 12-26HR INTMT MNTR: CPT

## 2022-01-31 PROCEDURE — 84100 ASSAY OF PHOSPHORUS: CPT

## 2022-01-31 PROCEDURE — 88305 TISSUE EXAM BY PATHOLOGIST: CPT

## 2022-01-31 PROCEDURE — 95700 EEG CONT REC W/VID EEG TECH: CPT

## 2022-01-31 PROCEDURE — 81025 URINE PREGNANCY TEST: CPT

## 2022-01-31 PROCEDURE — 84146 ASSAY OF PROLACTIN: CPT

## 2022-01-31 PROCEDURE — 95718 EEG PHYS/QHP 2-12 HR W/VEEG: CPT

## 2022-01-31 PROCEDURE — 83735 ASSAY OF MAGNESIUM: CPT

## 2022-01-31 PROCEDURE — 80053 COMPREHEN METABOLIC PANEL: CPT

## 2022-01-31 PROCEDURE — 80048 BASIC METABOLIC PNL TOTAL CA: CPT

## 2022-01-31 PROCEDURE — 36415 COLL VENOUS BLD VENIPUNCTURE: CPT

## 2022-01-31 PROCEDURE — 86364 TISS TRNSGLTMNASE EA IG CLAS: CPT

## 2022-01-31 PROCEDURE — U0005: CPT

## 2022-01-31 PROCEDURE — 95712 VEEG 2-12 HR INTMT MNTR: CPT

## 2022-01-31 PROCEDURE — 82784 ASSAY IGA/IGD/IGG/IGM EACH: CPT

## 2022-01-31 PROCEDURE — 88305 TISSUE EXAM BY PATHOLOGIST: CPT | Mod: 26

## 2022-01-31 PROCEDURE — 82550 ASSAY OF CK (CPK): CPT

## 2022-01-31 PROCEDURE — 83520 IMMUNOASSAY QUANT NOS NONAB: CPT

## 2022-01-31 PROCEDURE — U0003: CPT

## 2022-01-31 PROCEDURE — 99233 SBSQ HOSP IP/OBS HIGH 50: CPT | Mod: 1L

## 2022-01-31 PROCEDURE — 84443 ASSAY THYROID STIM HORMONE: CPT

## 2022-01-31 PROCEDURE — 80307 DRUG TEST PRSMV CHEM ANLYZR: CPT

## 2022-01-31 PROCEDURE — 85027 COMPLETE CBC AUTOMATED: CPT

## 2022-01-31 PROCEDURE — 82140 ASSAY OF AMMONIA: CPT

## 2022-01-31 PROCEDURE — 86769 SARS-COV-2 COVID-19 ANTIBODY: CPT

## 2022-01-31 RX ORDER — DEXMETHYLPHENIDATE HYDROCHLORIDE 35 MG/1
1 CAPSULE, EXTENDED RELEASE ORAL
Qty: 30 | Refills: 0
Start: 2022-01-31 | End: 2022-03-01

## 2022-01-31 RX ORDER — SODIUM CHLORIDE 9 MG/ML
500 INJECTION INTRAMUSCULAR; INTRAVENOUS; SUBCUTANEOUS
Refills: 0 | Status: DISCONTINUED | OUTPATIENT
Start: 2022-01-31 | End: 2022-01-31

## 2022-01-31 RX ADMIN — ZINC SULFATE TAB 220 MG (50 MG ZINC EQUIVALENT) 220 MILLIGRAM(S): 220 (50 ZN) TAB at 11:36

## 2022-01-31 RX ADMIN — FAMOTIDINE 20 MILLIGRAM(S): 10 INJECTION INTRAVENOUS at 17:14

## 2022-01-31 RX ADMIN — Medication 325 MILLIGRAM(S): at 17:14

## 2022-01-31 NOTE — DISCHARGE NOTE NURSING/CASE MANAGEMENT/SOCIAL WORK - NSDCPEFALRISK_GEN_ALL_CORE
For information on Fall & Injury Prevention, visit: [email or web address] OR  [email or web address] OR  https://www.cdc.gov/steadi/patient.html
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
## 2022-01-31 NOTE — PRE-ANESTHESIA EVALUATION ADULT - NSANTHASARD_GEN_ALL_CORE
Quality 130: Documentation Of Current Medications In The Medical Record: Current Medications Documented
Quality 226: Preventive Care And Screening: Tobacco Use: Screening And Cessation Intervention: Patient screened for tobacco use and is an ex/non-smoker
Quality 431: Preventive Care And Screening: Unhealthy Alcohol Use - Screening: Patient screened for unhealthy alcohol use using a single question and scores less than 2 times per year
Detail Level: Detailed
2

## 2022-01-31 NOTE — PROGRESS NOTE ADULT - ASSESSMENT
26 year old RH female with past medical history of transient neurological events currently not well explained presenting for EMU admission and evaluation.    Impression: Stereotypical events, high suspicion that they are epileptic in nature, awaiting event capture and characterization; cannot rule out autoimmune etiology     Semiology:   1) R eye vision loss and L sided weakness   2) Spacing out episodes which may be accompanied by syncope     Plan:   [] vEEG   [] hold anti-seizure meds for now   [] NPO at midnight for procedure  [] EGD with GIDr. Cote today  [] Neurochecks Q4h  [] Telemetry  [] DVT PPx with Lovenox 40mg subq daily   [] will f/u outpatient upon discharge with epilepsy neurology, Dr. Phoenix  [] advised for now not to drive, operate heavy machinery, avoid heights, pools, bathtubs.    RESCUE: Ativan 1mg IV and 2nd line Vimpat 100mg     Patient seen and discussed with neurology attending, Dr. Phoenix.  26 year old RH female with past medical history of transient neurological events currently not well explained presenting for EMU admission and evaluation.    Impression: Stereotypical events, high suspicion that they are epileptic in nature, awaiting event capture and characterization; cannot rule out autoimmune etiology     Semiology:   1) R eye vision loss and L sided weakness   2) Spacing out episodes which may be accompanied by syncope     Plan:   [] vEEG   [] hold anti-seizure meds for now   [] Start Focalin ER 40mg 10 am tmr and 10mg immediate release tmr 5pm  [] Advised patient to follow up with psychiatrist outpatient for her ADHD medication management  [] Continue Gluten restricted diet  [] EGD with GI, Dr. Cote today, follow up with Dr. Cote outpatient for biopsy results  [] Neurochecks Q4h  [] Telemetry  [] DVT PPx with Lovenox 40mg subq daily   [] will f/u outpatient upon discharge with epilepsy neurology, Dr. Phoenix  [] advised for now not to drive, operate heavy machinery, avoid heights, pools, bathtubs.    RESCUE: Ativan 1mg IV and 2nd line Vimpat 100mg     Patient seen and discussed with neurology attending, Dr. Phoenix.

## 2022-01-31 NOTE — DISCHARGE NOTE PROVIDER - NSDCMRMEDTOKEN_GEN_ALL_CORE_FT
famotidine 20 mg oral tablet: 1 tab(s) orally 2 times a day  ferrous sulfate 324 mg (65 mg elemental iron) oral tablet: 1 tab(s) orally 2 times a day  Focalin 10 mg oral tablet: 1 tab(s) orally once a day at 5pm MDD:10mg  Focalin XR 40 mg oral capsule, extended release: 1 cap(s) orally once a day in the morning MDD:40mg  Vitamin B1 50 mg oral tablet: 1 tab(s) orally once a day  Vitamin B2 50 mg oral tablet: 1 tab(s) orally once a day  Zinc 140 mg (as elemental zinc 50 mg) oral tablet: 1 tab(s) orally once a day

## 2022-01-31 NOTE — PROGRESS NOTE ADULT - ATTENDING COMMENTS
completely agree with this extremely complex history ( congratulations to the resident that took it)  events stereotype, likely epileptic in nature.  VEEG monitoring for event characterization and localization  may need additional autoimmune workup  no AEDs for now  rescue ativan/vimpat only for convulsions
possibly gluten induced symptomatology .  recent reports of association between celiac and occipital lobe epilepsy    EEG w intermitent gen slowing, no seizures
minimal abnormal EEG possibly secondary to Celiac disease( there are association between focal seizures and gluten sensitivity)  has co morbid ADHD  will be dc home tomorrow on Focalin xr 40 mg in am and 10 mg immediate release in pm.   will avoid gluten for now  pending biopsy results but likely will be normal due to insufficient gluten exposure

## 2022-01-31 NOTE — EEG REPORT - NS EEG TEXT BOX
Day 3 – 	Start: 1/30/2022  8:00   	End: 1/31/2022  08:00  	Duration: 24 hr      Daily EEG Visual Analysis    FINDINGS:  The background was continuous, spontaneously variable and reactive. During wakefulness, the posterior dominant rhythm consisted of symmetric, well-modulated 9 Hz activity, with amplitude to 30 uV, that attenuated to eye opening.  Low amplitude frontal beta was noted in wakefulness.    Background Slowing:  Intermittent theta/delta slowing.    Focal Slowing:   Intermittent polymorphic theta/delta slowing in the right posterior quadrant    Sleep Background:  Drowsiness was characterized by fragmentation, attenuation, and slowing of the background activity.    Sleep was characterized by the presence of vertex waves, symmetric sleep spindles and K-complexes.    Other Non-Epileptiform Findings:  None were present.    Interictal Epileptiform Activity:   None were present.    Events:  No events or seizures recorded.    Artifacts:  Intermittent myogenic and movement artifacts were noted.    ECG:  The heart rate on single channel ECG was predominantly between 60-70 BPM.    AEDs:  None    EEG Summary:    Abnormal EEG in the awake, drowsy and asleep states.  Intermittent polymorphic theta/delta slowing in the right posterior quadrant  Mild generalized slowing    Impression/Clinical Correlate:    This is an  abnormal EEG record. There is evidence for a functional abnormality in the right  posterior quadrant as well as for mild multifocal/diffuse cerebral dysfunction. No epileptiform pattern or seizures were seen.    Preliminary Fellow Report, final report pending attending review    Dom Pineda MD  Epilepsy Fellow    Reading Room: [phone]  On Call Service After Hours: [phone]    Day 3 – 	Start: 1/30/2022  8:00   	End: 1/31/2022  08:00  	Duration: 24 hr      Daily EEG Visual Analysis    FINDINGS:  The background was continuous, spontaneously variable and reactive. During wakefulness, the posterior dominant rhythm consisted of symmetric, well-modulated 9 Hz activity, with amplitude to 30 uV, that attenuated to eye opening.  Low amplitude frontal beta was noted in wakefulness.    Background Slowing:  Intermittent theta/delta slowing.    Focal Slowing:   None    Sleep Background:  Drowsiness was characterized by fragmentation, attenuation, and slowing of the background activity.    Sleep was characterized by the presence of vertex waves, symmetric sleep spindles and K-complexes.    Other Non-Epileptiform Findings:  None were present.    Interictal Epileptiform Activity:   None were present.    Events:  No events or seizures recorded.    Artifacts:  Intermittent myogenic and movement artifacts were noted.    ECG:  The heart rate on single channel ECG was predominantly between 60-70 BPM.    AEDs:  None    EEG Summary:    Abnormal EEG in the awake, drowsy and asleep states.  Mild generalized slowing    Impression/Clinical Correlate:    This is an  abnormal EEG record. There is evidence for mild multifocal/diffuse cerebral dysfunction. No epileptiform pattern or seizures were seen.    Preliminary Fellow Report, final report pending attending review    Dom Pineda MD  Epilepsy Fellow    Reading Room: [phone]  On Call Service After Hours: [phone]    Day 3 – 	Start: 1/30/2022  8:00   	End: 1/31/2022  1751  	Duration: 33 hr  51 min    Daily EEG Visual Analysis    FINDINGS:  The background was continuous, spontaneously variable and reactive. During wakefulness, the posterior dominant rhythm consisted of symmetric, well-modulated 9 Hz activity, with amplitude to 30 uV, that attenuated to eye opening.  Low amplitude frontal beta was noted in wakefulness.    Background Slowing:  Intermittent theta/delta slowing.    Focal Slowing:   None    Sleep Background:  Drowsiness was characterized by fragmentation, attenuation, and slowing of the background activity.    Sleep was characterized by the presence of vertex waves, symmetric sleep spindles and K-complexes.    Other Non-Epileptiform Findings:  None were present.    Interictal Epileptiform Activity:   None were present.    Events:  No events or seizures recorded.    Artifacts:  Intermittent myogenic and movement artifacts were noted.    ECG:  The heart rate on single channel ECG was predominantly between 60-70 BPM.    AEDs:  None    EEG Summary:    Abnormal EEG in the awake, drowsy and asleep states.  Mild generalized slowing    Impression/Clinical Correlate:    This is an  abnormal EEG record. There is evidence for mild multifocal/diffuse cerebral dysfunction. No epileptiform pattern or seizures were seen.    Preliminary Fellow Report, final report pending attending review    Dom Pineda MD  Epilepsy Fellow    Reading Room: [phone]  On Call Service After Hours: [phone]    Day 3 – 	Start: 1/30/2022  8:00   	End: 1/31/2022  1751  	Duration: 33 hr  51 min    Daily EEG Visual Analysis    FINDINGS:  The background was continuous, spontaneously variable and reactive. During wakefulness, the posterior dominant rhythm consisted of symmetric, well-modulated 9 Hz activity, with amplitude to 30 uV, that attenuated to eye opening.  Low amplitude frontal beta was noted in wakefulness.    Background Slowing:  Intermittent theta/delta slowing.    Focal Slowing:   None    Sleep Background:  Drowsiness was characterized by fragmentation, attenuation, and slowing of the background activity.    Sleep was characterized by the presence of vertex waves, symmetric sleep spindles and K-complexes.    Other Non-Epileptiform Findings:  None were present.    Interictal Epileptiform Activity:   None were present.    Events:  No events or seizures recorded.    Artifacts:  Intermittent myogenic and movement artifacts were noted.    ECG:  The heart rate on single channel ECG was predominantly between 60-70 BPM.    AEDs:  None    EEG Summary:    Abnormal EEG in the awake, drowsy and asleep states.  Mild generalized slowing    Impression/Clinical Correlate:    This is an  abnormal EEG record. There is evidence for mild multifocal/diffuse cerebral dysfunction. No epileptiform pattern or seizures were seen.      Dom Pineda MD  Epilepsy Fellow    Reading Room: [phone]  On Call Service After Hours: [phone]     Sriram Bruno MD  Neurology Attending Physician

## 2022-01-31 NOTE — PRE PROCEDURE NOTE - PRE PROCEDURE EVALUATION
Attending Physician:    Dr. Cote                        Procedure:   EGD    Indication for Procedure:   Suspected Celiac Disease  ________________________________________________________  PAST MEDICAL & SURGICAL HISTORY:    Wheat allergy  Polyp of corpus uteri  History of tachycardia  ADHD  History of loop recorder  History of D&C      ALLERGIES:  Gluten (Anaphylaxis)  Macrobid (Anaphylaxis)  penicillin (Anaphylaxis)    HOME MEDICATIONS:  famotidine 20 mg oral tablet: 1 tab(s) orally 2 times a day  ferrous sulfate 324 mg (65 mg elemental iron) oral tablet: 1 tab(s) orally 2 times a day  Vitamin B1 50 mg oral tablet: 1 tab(s) orally once a day  Vitamin B2 50 mg oral tablet: 1 tab(s) orally once a day  Zinc 140 mg (as elemental zinc 50 mg) oral tablet: 1 tab(s) orally once a day    AICD/PPM: [ ] yes   [X ] no    PERTINENT LAB DATA:                        12.5   10.96 )-----------( 355      ( 31 Jan 2022 06:57 )             37.8     01-30    135  |  101  |  15  ----------------------------<  88  4.0   |  21<L>  |  0.48<L>    Ca    9.0      30 Jan 2022 06:45    PHYSICAL EXAMINATION:    T(C): 37.1  HR: 81  BP: 95/61  RR: 17  SpO2: 99%    Constitutional: NAD    Neck:  No JVD  Respiratory: CTAB/L  Cardiovascular: S1 and S2  Gastrointestinal: BS+, soft, NT/ND  Extremities: No peripheral edema  Neurological: A/O x 4      COMMENTS:    The patient is a suitable candidate for the planned procedure unless box checked [ ]  No, explain:

## 2022-01-31 NOTE — DISCHARGE NOTE NURSING/CASE MANAGEMENT/SOCIAL WORK - PATIENT PORTAL LINK FT
You can access the FollowMyHealth Patient Portal offered by [facility] by registering at the following website: [email or web address]. By joining [facility]’s FollowMyHealth portal, you will also be able to view your health information using other applications (apps) compatible with our system.

## 2022-01-31 NOTE — DISCHARGE NOTE PROVIDER - NSDCFUSCHEDAPPT_GEN_ALL_CORE_FT
ARIANE HAWKINS ; 02/09/2022 ; NPP OrthoSurg [facility]  ARIANE HAWKINS ; 03/01/2022 ; NPP Rheum [address] ARIANE Gambel ; 04/11/2022 ; NPP Neuro [address] [facility]

## 2022-01-31 NOTE — CONSULT NOTE ADULT - SUBJECTIVE AND OBJECTIVE BOX
Chief Complaint:  Patient is a 26y old  Female who presents with a chief complaint of seizure    Date of service: 01-31-22 @ 19:32    HPI:    The patient is a 26 year old female who presented with seizure like activity. She reports a history of intolerance to gluten and had a positive HLA genetic test by her GI Dr. Danielson. She generally avoids gluten, but has had some over the past 2 weeks.    The patient denies dysphagia, nausea and vomiting, abdominal pain, diarrhea, unintentional weight loss, change in bowel habits or NSAID use.      Allergies:  Gluten (Anaphylaxis)  Macrobid (Anaphylaxis)  penicillin (Anaphylaxis)      Home Medications:    Hospital Medications:  enoxaparin Injectable 40 milliGRAM(s) SubCutaneous daily  famotidine    Tablet 20 milliGRAM(s) Oral two times a day  ferrous    sulfate 325 milliGRAM(s) Oral two times a day  lacosamide Injectable 100 milliGRAM(s) IV Push once PRN  LORazepam   Injectable 1 milliGRAM(s) IV Push once PRN  sodium chloride 0.9%. 500 milliLiter(s) IV Continuous <Continuous>  zinc sulfate 220 milliGRAM(s) Oral daily      PMHX/PSHX:  Wheat allergy    Polyp of corpus uteri    History of tachycardia    ADHD    History of loop recorder    History of D&amp;C        Family history:  No pertinent family history in first degree relatives    FH: stroke (Mother)    FH: stroke (Mother, Father)    FH: type 2 diabetes (Mother)        Social History:   Denies ethanol use.  Denies illicit drug use.    ROS:     General:  No wt loss, fevers, chills, night sweats, fatigue,   Eyes:  Good vision, no reported pain  ENT:  No sore throat, pain, runny nose, dysphagia  CV:  No pain, palpitations, hypo/hypertension  Resp:  No dyspnea, cough, tachypnea, wheezing  GI:  See HPI  :  No pain, bleeding, incontinence, nocturia  Muscle:  No pain, weakness  Neuro:  No weakness, tingling, memory problems  Psych:  No fatigue, insomnia, mood problems, depression  Endocrine:  No polyuria, polydipsia, cold/heat intolerance  Heme:  No petechiae, ecchymosis, easy bruisability  Integumentary:  No rash, edema      PHYSICAL EXAM:     GENERAL:  Appears stated age, well-groomed, well-nourished, no distress  HEENT:  NC/AT,  conjunctivae anicteric, clear and pink,   NECK: supple, trachea midline  CHEST:  Full & symmetric excursion, no increased effort, breath sounds clear  HEART:  Regular rhythm, no JVD  ABDOMEN:  Soft, non-tender, non-distended, normoactive bowel sounds,  no masses , no hepatosplenomegaly  EXTREMITIES:  no cyanosis,clubbing or edema  SKIN:  No rash, erythema, or, ecchymoses, no jaundice  NEURO:  Alert, non-focal, no asterixis  PSYCH: Appropriate affect, oriented to place and time  RECTAL: Deferred      Vital Signs:  Vital Signs Last 24 Hrs  T(C): 36.7 (31 Jan 2022 12:07), Max: 37.1 (31 Jan 2022 07:37)  T(F): 98.1 (31 Jan 2022 12:07), Max: 98.7 (31 Jan 2022 07:37)  HR: 79 (31 Jan 2022 12:07) (68 - 93)  BP: 96/57 (31 Jan 2022 12:07) (95/61 - 123/80)  BP(mean): --  RR: 18 (31 Jan 2022 12:07) (15 - 19)  SpO2: 99% (31 Jan 2022 12:07) (95% - 100%)  Daily     Daily     LABS: Labs personally reviewed by me:                        12.5   10.96 )-----------( 355      ( 31 Jan 2022 06:57 )             37.8     01-31    133<L>  |  101  |  14  ----------------------------<  84  3.9   |  22  |  0.53    Ca    8.9      31 Jan 2022 06:57                Imaging personally reviewed by me:

## 2022-01-31 NOTE — PRE-ANESTHESIA EVALUATION ADULT - LAST CARDIAC ANGIOGRAM/IMAGING
CTA 2021- normal per pt - noted lung inflammation patient is looking for pulmonologist for further eval

## 2022-01-31 NOTE — DISCHARGE NOTE PROVIDER - NSDCCPCAREPLAN_GEN_ALL_CORE_FT
PRINCIPAL DISCHARGE DIAGNOSIS  Diagnosis: Celiac disease  Assessment and Plan of Treatment: EEG findings with no evidence of seizures, +slowing  Maintain gluten-free diet  Follow up with Dr. Cote

## 2022-01-31 NOTE — DISCHARGE NOTE PROVIDER - CARE PROVIDERS DIRECT ADDRESSES
,[email or web address],DirectAddress_[email or web address]

## 2022-01-31 NOTE — DISCHARGE NOTE PROVIDER - CARE PROVIDER_API CALL
Teresita Phoenix)  EEGEpilepsy; Neurology  [address] [facility], [facility] [address]  [place] [address]  Phone: [phone]  Fax: [phone])-  Follow Up Time: 1 week    Fredy Cote)  Internal Medicine  [address] [place] [address]  Phone: [phone]  Fax: [phone]  Follow Up Time: 1 week

## 2022-01-31 NOTE — DISCHARGE NOTE PROVIDER - PROVIDER TOKENS
PROVIDER:[TOKEN:[[id number]],FOLLOWUP:[1 week]],PROVIDER:[TOKEN:[[id number]],FOLLOWUP:[1 week]]
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
## 2022-02-01 LAB
TTG IGA SER-ACNC: <1.2 U/ML — SIGNIFICANT CHANGE UP
TTG IGA SER-ACNC: NEGATIVE — SIGNIFICANT CHANGE UP
TTG IGG SER IA-ACNC: NEGATIVE — SIGNIFICANT CHANGE UP
TTG IGG SER-ACNC: 4.2 U/ML — SIGNIFICANT CHANGE UP

## 2022-02-02 LAB — SURGICAL PATHOLOGY STUDY: SIGNIFICANT CHANGE UP

## 2022-02-08 LAB
AMPHET UR-MCNC: NEGATIVE — SIGNIFICANT CHANGE UP
BARBITURATES, URINE.: NEGATIVE — SIGNIFICANT CHANGE UP
BENZODIAZ UR-MCNC: NEGATIVE — SIGNIFICANT CHANGE UP
COCAINE METAB.OTHER UR-MCNC: NEGATIVE — SIGNIFICANT CHANGE UP
CREATININE, URINE THERAPEUTIC: 97.6 MG/DL — SIGNIFICANT CHANGE UP
METHADONE UR-MCNC: NEGATIVE — SIGNIFICANT CHANGE UP
METHAQUALONE UR QL: NEGATIVE — SIGNIFICANT CHANGE UP
METHAQUALONE UR-MCNC: NEGATIVE — SIGNIFICANT CHANGE UP
OPIATES UR-MCNC: NEGATIVE — SIGNIFICANT CHANGE UP
PCP UR-MCNC: NEGATIVE — SIGNIFICANT CHANGE UP
PROPOXYPH UR QL: NEGATIVE — SIGNIFICANT CHANGE UP
THC UR QL: NEGATIVE — SIGNIFICANT CHANGE UP

## 2022-02-09 ENCOUNTER — APPOINTMENT (OUTPATIENT)
Dept: ORTHOPEDIC SURGERY | Facility: CLINIC | Age: 27
End: 2022-02-09

## 2022-03-01 ENCOUNTER — APPOINTMENT (OUTPATIENT)
Dept: RHEUMATOLOGY | Facility: CLINIC | Age: 27
End: 2022-03-01

## 2022-03-11 ENCOUNTER — NON-APPOINTMENT (OUTPATIENT)
Age: 27
End: 2022-03-11

## 2022-03-18 ENCOUNTER — EMERGENCY (EMERGENCY)
Facility: HOSPITAL | Age: 27
LOS: 1 days | Discharge: HOME | End: 2022-03-18
Attending: EMERGENCY MEDICINE | Admitting: EMERGENCY MEDICINE
Payer: COMMERCIAL

## 2022-03-18 VITALS
RESPIRATION RATE: 18 BRPM | OXYGEN SATURATION: 99 % | WEIGHT: 145.06 LBS | TEMPERATURE: 98 F | HEIGHT: 62 IN | SYSTOLIC BLOOD PRESSURE: 125 MMHG | DIASTOLIC BLOOD PRESSURE: 79 MMHG | HEART RATE: 100 BPM

## 2022-03-18 DIAGNOSIS — Z98.890 OTHER SPECIFIED POSTPROCEDURAL STATES: Chronic | ICD-10-CM

## 2022-03-18 PROCEDURE — 72040 X-RAY EXAM NECK SPINE 2-3 VW: CPT

## 2022-03-18 PROCEDURE — 99284 EMERGENCY DEPT VISIT MOD MDM: CPT

## 2022-03-18 PROCEDURE — 99283 EMERGENCY DEPT VISIT LOW MDM: CPT | Mod: 25

## 2022-03-18 PROCEDURE — 72040 X-RAY EXAM NECK SPINE 2-3 VW: CPT | Mod: 26

## 2022-03-18 RX ORDER — MELOXICAM 15 MG/1
1 TABLET ORAL
Qty: 30 | Refills: 0
Start: 2022-03-18 | End: 2022-04-01

## 2022-03-18 RX ORDER — METHOCARBAMOL 500 MG/1
1 TABLET, FILM COATED ORAL
Qty: 15 | Refills: 0
Start: 2022-03-18 | End: 2022-03-22

## 2022-03-18 RX ORDER — METHOCARBAMOL 500 MG/1
500 TABLET, FILM COATED ORAL ONCE
Refills: 0 | Status: COMPLETED | OUTPATIENT
Start: 2022-03-18 | End: 2022-03-18

## 2022-03-18 RX ORDER — IBUPROFEN 200 MG
600 TABLET ORAL ONCE
Refills: 0 | Status: COMPLETED | OUTPATIENT
Start: 2022-03-18 | End: 2022-03-18

## 2022-03-18 RX ADMIN — Medication 600 MILLIGRAM(S): at 15:57

## 2022-03-18 RX ADMIN — METHOCARBAMOL 500 MILLIGRAM(S): 500 TABLET, FILM COATED ORAL at 15:57

## 2022-03-18 NOTE — ED PROVIDER NOTE - OBJECTIVE STATEMENT
26 F co neck pain- R sided and L ant neck pain- fall 7-8 days ago- sharp pain to neck worse w movement- has been tolerating po without difficulty for 1 week since injury- feels like her swallowing is different- fall was [place]- no ha no n/v  eval by ortho- neg shoulder xray- told she has a neck problem- taking tylenol and ibu without sig relief- some difficulty sleeping last night  mild-moderate severity

## 2022-03-18 NOTE — ED PROVIDER NOTE - PATIENT PORTAL LINK FT
You can access the FollowMyHealth Patient Portal offered by [facility] by registering at the following website: [email or web address]. By joining [employer]’s FollowMyHealth portal, you will also be able to view your health information using other applications (apps) compatible with our system.

## 2022-03-18 NOTE — ED ADULT TRIAGE NOTE - CHIEF COMPLAINT QUOTE
Pt reports she fell last Thursday, due to frequent syncope episodes (pt has loop recorder). pt complains of increase neck  since yesterday and right should pain since Sunday.

## 2022-03-18 NOTE — ED PROVIDER NOTE - NSFOLLOWUPINSTRUCTIONS_ED_ALL_ED_FT
Cervical Strain and Sprain Rehab      Ask your health care provider which exercises are safe for you. Do exercises exactly as told by your health care provider and adjust them as directed. It is normal to feel mild stretching, pulling, tightness, or discomfort as you do these exercises. Stop right away if you feel sudden pain or your pain gets worse. Do not begin these exercises until told by your health care provider.      Stretching and range-of-motion exercises      Cervical side bending      1.Using good posture, sit on a stable chair or stand up.      2.Without moving your shoulders, slowly tilt your left / right ear to your shoulder until you feel a stretch in the opposite side neck muscles. You should be looking straight ahead.      3.Hold for __________ seconds.      4.Repeat with the other side of your neck.      Repeat __________ times. Complete this exercise __________ times a day.      Cervical rotation      1.Using good posture, sit on a stable chair or stand up.    2.Slowly turn your head to the side as if you are looking over your left / right shoulder.  •Keep your eyes level with the ground.      •Stop when you feel a stretch along the side and the back of your neck.        3.Hold for __________ seconds.      4.Repeat this by turning to your other side.      Repeat __________ times. Complete this exercise __________ times a day.    Thoracic extension and pectoral stretch     1.Roll a towel or a small blanket so it is about 4 inches (10 cm) in diameter.      2.Lie down on your back on a firm surface.      3.Put the towel lengthwise, under your spine in the middle of your back. It should not be under your shoulder blades. The towel should line up with your spine from your middle back to your lower back.      4.Put your hands behind your head and let your elbows fall out to your sides.      5.Hold for __________ seconds.      Repeat __________ times. Complete this exercise __________ times a day.      Strengthening exercises    Isometric upper cervical flexion     1.Lie on your back with a thin pillow behind your head and a small rolled-up towel under your neck.      2.Gently tuck your chin toward your chest and nod your head down to look toward your feet. Do not lift your head off the pillow.      3.Hold for __________ seconds.      4.Release the tension slowly. Relax your neck muscles completely before you repeat this exercise.      Repeat __________ times. Complete this exercise __________ times a day.      Isometric cervical extension      1.Stand about 6 inches (15 cm) away from a wall, with your back facing the wall.      2.Place a soft object, about 6–8 inches (15–20 cm) in diameter, between the back of your head and the wall. A soft object could be a small pillow, a ball, or a folded towel.      3.Gently tilt your head back and press into the soft object. Keep your jaw and forehead relaxed.      4.Hold for __________ seconds.      5.Release the tension slowly. Relax your neck muscles completely before you repeat this exercise.      Repeat __________ times. Complete this exercise __________ times a day.      Posture and body mechanics    Body mechanics refers to the movements and positions of your body while you do your daily activities. Posture is part of body mechanics. Good posture and healthy body mechanics can help to relieve stress in your body's tissues and joints. Good posture means that your spine is in its natural S-curve position (your spine is neutral), your shoulders are pulled back slightly, and your head is not tipped forward. The following are general guidelines for applying improved posture and body mechanics to your everyday activities.      Sitting      1.When sitting, keep your spine neutral and keep your feet flat on the floor. Use a footrest, if necessary, and keep your thighs parallel to the floor. Avoid rounding your shoulders, and avoid tilting your head forward.      2.When working at a desk or a computer, keep your desk at a height where your hands are slightly lower than your elbows. Slide your chair under your desk so you are close enough to maintain good posture.      3.When working at a computer, place your monitor at a height where you are looking straight ahead and you do not have to tilt your head forward or downward to look at the screen.        Standing      •When standing, keep your spine neutral and keep your feet about hip-width apart. Keep a slight bend in your knees. Your ears, shoulders, and hips should line up.      •When you do a task in which you  one place for a long time, place one foot up on a stable object that is 2–4 inches (5–10 cm) high, such as a footstool. This helps keep your spine neutral.      Resting     When lying down and resting, avoid positions that are most painful for you. Try to support your neck in a neutral position. You can use a contour pillow or a small rolled-up towel. Your pillow should support your neck but not push on it.    This information is not intended to replace advice given to you by your health care provider. Make sure you discuss any questions you have with your health care provider.      Document Revised: 04/08/2020 Document Reviewed: 09/18/2019    Elsevier Patient Education © 2022 [employer]

## 2022-03-18 NOTE — ED ADULT NURSE NOTE - OBJECTIVE STATEMENT
Pt reports fall last Thursday d/t syncope. Pt has loop recorder. Reports pain to bilateral sides of neck, denies headache, dizziness, n/v, weakness, no slurred speech, Pt ambulating with steady gait.
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
## 2022-03-21 DIAGNOSIS — M54.2 CERVICALGIA: ICD-10-CM

## 2022-03-21 DIAGNOSIS — S16.1XXA STRAIN OF MUSCLE, FASCIA AND TENDON AT NECK LEVEL, INITIAL ENCOUNTER: ICD-10-CM

## 2022-03-21 DIAGNOSIS — Z88.8 ALLERGY STATUS TO OTHER DRUGS, MEDICAMENTS AND BIOLOGICAL SUBSTANCES STATUS: ICD-10-CM

## 2022-03-21 DIAGNOSIS — Z91.010 ALLERGY TO PEANUTS: ICD-10-CM

## 2022-03-21 DIAGNOSIS — Z88.0 ALLERGY STATUS TO PENICILLIN: ICD-10-CM

## 2022-03-21 DIAGNOSIS — Z91.018 ALLERGY TO OTHER FOODS: ICD-10-CM
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
## 2022-03-31 ENCOUNTER — APPOINTMENT (OUTPATIENT)
Dept: MRI IMAGING | Facility: CLINIC | Age: 27
End: 2022-03-31
Payer: MEDICAID

## 2022-03-31 ENCOUNTER — RESULT REVIEW (OUTPATIENT)
Age: 27
End: 2022-03-31

## 2022-03-31 ENCOUNTER — OUTPATIENT (OUTPATIENT)
Dept: OUTPATIENT SERVICES | Facility: HOSPITAL | Age: 27
LOS: 1 days | End: 2022-03-31

## 2022-03-31 ENCOUNTER — NON-APPOINTMENT (OUTPATIENT)
Age: 27
End: 2022-03-31

## 2022-03-31 DIAGNOSIS — Z98.890 OTHER SPECIFIED POSTPROCEDURAL STATES: Chronic | ICD-10-CM

## 2022-03-31 PROCEDURE — 70544 MR ANGIOGRAPHY HEAD W/O DYE: CPT | Mod: 26,59

## 2022-03-31 PROCEDURE — 70551 MRI BRAIN STEM W/O DYE: CPT | Mod: 26

## 2022-03-31 PROCEDURE — 70549 MR ANGIOGRAPH NECK W/O&W/DYE: CPT | Mod: 26

## 2022-04-01 ENCOUNTER — NON-APPOINTMENT (OUTPATIENT)
Age: 27
End: 2022-04-01

## 2022-04-04 ENCOUNTER — APPOINTMENT (OUTPATIENT)
Dept: RADIOLOGY | Facility: CLINIC | Age: 27
End: 2022-04-04
Payer: MEDICAID

## 2022-04-04 ENCOUNTER — APPOINTMENT (OUTPATIENT)
Dept: SPINE | Facility: CLINIC | Age: 27
End: 2022-04-04
Payer: MEDICAID

## 2022-04-04 ENCOUNTER — RESULT REVIEW (OUTPATIENT)
Age: 27
End: 2022-04-04

## 2022-04-04 ENCOUNTER — APPOINTMENT (OUTPATIENT)
Dept: NEUROSURGERY | Facility: CLINIC | Age: 27
End: 2022-04-04

## 2022-04-04 ENCOUNTER — APPOINTMENT (OUTPATIENT)
Dept: NEUROLOGY | Facility: CLINIC | Age: 27
End: 2022-04-04
Payer: MEDICAID

## 2022-04-04 ENCOUNTER — OUTPATIENT (OUTPATIENT)
Dept: OUTPATIENT SERVICES | Facility: HOSPITAL | Age: 27
LOS: 1 days | End: 2022-04-04
Payer: MEDICAID

## 2022-04-04 VITALS
HEART RATE: 107 BPM | DIASTOLIC BLOOD PRESSURE: 87 MMHG | HEIGHT: 63 IN | OXYGEN SATURATION: 97 % | SYSTOLIC BLOOD PRESSURE: 129 MMHG | BODY MASS INDEX: 26.22 KG/M2 | WEIGHT: 148 LBS

## 2022-04-04 VITALS
HEIGHT: 63 IN | HEART RATE: 113 BPM | SYSTOLIC BLOOD PRESSURE: 133 MMHG | WEIGHT: 148 LBS | BODY MASS INDEX: 26.22 KG/M2 | DIASTOLIC BLOOD PRESSURE: 82 MMHG

## 2022-04-04 DIAGNOSIS — M54.2 CERVICALGIA: ICD-10-CM

## 2022-04-04 DIAGNOSIS — Z98.890 OTHER SPECIFIED POSTPROCEDURAL STATES: Chronic | ICD-10-CM

## 2022-04-04 PROCEDURE — 99203 OFFICE O/P NEW LOW 30 MIN: CPT

## 2022-04-04 PROCEDURE — 99215 OFFICE O/P EST HI 40 MIN: CPT

## 2022-04-04 PROCEDURE — 72052 X-RAY EXAM NECK SPINE 6/>VWS: CPT | Mod: 26

## 2022-04-04 PROCEDURE — 72052 X-RAY EXAM NECK SPINE 6/>VWS: CPT

## 2022-04-04 RX ORDER — LISDEXAMFETAMINE DIMESYLATE 30 MG/1
30 CAPSULE ORAL
Qty: 30 | Refills: 0 | Status: DISCONTINUED | COMMUNITY
Start: 2021-09-27 | End: 2022-04-04

## 2022-04-04 RX ORDER — BUDESONIDE AND FORMOTEROL FUMARATE DIHYDRATE 80; 4.5 UG/1; UG/1
80-4.5 AEROSOL RESPIRATORY (INHALATION)
Refills: 0 | Status: ACTIVE | COMMUNITY

## 2022-04-04 RX ORDER — EPINEPHRINE HCL IN 0.9 % NACL 100 MCG/10
0.1 SYRINGE (ML) INTRAVENOUS
Refills: 0 | Status: ACTIVE | COMMUNITY

## 2022-04-04 RX ORDER — BUDESONIDE AND FORMOTEROL FUMARATE DIHYDRATE 160; 4.5 UG/1; UG/1
160-4.5 AEROSOL RESPIRATORY (INHALATION)
Refills: 0 | Status: ACTIVE | COMMUNITY

## 2022-04-04 RX ORDER — ZINC SULFATE TAB 220 MG (50 MG ZINC EQUIVALENT) 220 (50 ZN) MG
220 (50 ZN) TAB ORAL
Qty: 30 | Refills: 0 | Status: DISCONTINUED | COMMUNITY
Start: 2021-12-22 | End: 2022-04-04

## 2022-04-04 RX ORDER — ALPRAZOLAM 0.5 MG/1
0.5 TABLET ORAL
Refills: 0 | Status: ACTIVE | COMMUNITY

## 2022-04-04 RX ORDER — PREDNISONE 10 MG/1
10 TABLET ORAL
Refills: 0 | Status: ACTIVE | COMMUNITY

## 2022-04-04 NOTE — PHYSICAL EXAM
[General Appearance - Alert] : alert [General Appearance - In No Acute Distress] : in no acute distress [Oriented To Time, Place, And Person] : oriented to person, place, and time [Impaired Insight] : insight and judgment were intact [Affect] : the affect was normal [Sclera] : the sclera and conjunctiva were normal [PERRL With Normal Accommodation] : pupils were equal in size, round, reactive to light, with normal accommodation [Extraocular Movements] : extraocular movements were intact [Outer Ear] : the ears and nose were normal in appearance [Oropharynx] : the oropharynx was normal [Neck Appearance] : the appearance of the neck was normal [Neck Cervical Mass (___cm)] : no neck mass was observed [Heart Rate And Rhythm] : heart rate was normal and rhythm regular [Exaggerated Use Of Accessory Muscles For Inspiration] : no accessory muscle use [Edema] : there was no peripheral edema [Abnormal Walk] : normal gait [Nail Clubbing] : no clubbing  or cyanosis of the fingernails [Involuntary Movements] : no involuntary movements were seen [Motor Tone] : muscle strength and tone were normal [Skin Color & Pigmentation] : normal skin color and pigmentation [Skin Turgor] : normal skin turgor [Skin Lesions] : no skin lesions [] : no rash [FreeTextEntry1] : \par Neurologic examination:\par \par Mental status:\par \par The patient is alert, attentive, and oriented. Speech is clear and fluent with good comprehension.\par \par Cranial nerves:\par \par CN II: Visual fields are full to confrontation. \par \par CN III, IV, VI: At primary gaze, there is no eye deviation.EOMI. No ptosis\par \par CN V: Facial sensation is intact bilaterally.\par \par CN VII: Face is symmetric with normal eye closure and smile.\par \par CN VII: Hearing is grossly intact\par \par CN IX, X: Palate elevates symmetrically. Phonation is normal.\par \par CN XI: Head turning and shoulder shrug are intact\par \par CN XII: Tongue is midline with normal movements and no atrophy.\par \par Motor:\par \par There is no pronator drift of out-stretched arms. Muscle bulk and tone are normal.  There was moderate giveway on the left with manual motor testing, but with encouragement, power was normal throughout.   Fine finger movements are intact. There are no abnormal or involuntary movements. \par \par Sensory:\par \par Light touch intact in fingers and toes. No extinction.\par \par Coordination:\par \par There is no dysmetria on finger-to-nose.\par \par Reflexes: 1+ throughout, trace at the achilles\par \par Gait/Stance: Within normal limits\par

## 2022-04-04 NOTE — PHYSICAL EXAM
[Person] : oriented to person [Place] : oriented to place [Time] : oriented to time [Short Term Intact] : short term memory intact [Remote Intact] : remote memory intact [Span Intact] : the attention span was normal [Concentration Intact] : normal concentrating ability [Fluency] : fluency intact [Comprehension] : comprehension intact [Current Events] : adequate knowledge of current events [Past History] : adequate knowledge of personal past history [Vocabulary] : adequate range of vocabulary [Cranial Nerves Optic (II)] : visual acuity intact bilaterally,  pupils equal round and reactive to light [Cranial Nerves Oculomotor (III)] : extraocular motion intact [Cranial Nerves Trigeminal (V)] : facial sensation intact symmetrically [Cranial Nerves Facial (VII)] : face symmetrical [Cranial Nerves Vestibulocochlear (VIII)] : hearing was intact bilaterally [Cranial Nerves Glossopharyngeal (IX)] : tongue and palate midline [Cranial Nerves Accessory (XI - Cranial And Spinal)] : head turning and shoulder shrug symmetric [Cranial Nerves Hypoglossal (XII)] : there was no tongue deviation with protrusion [Motor Tone] : muscle tone was normal in all four extremities [Motor Strength] : muscle strength was normal in all four extremities [No Muscle Atrophy] : normal bulk in all four extremities [Sensation Tactile Decrease] : light touch was intact [Abnormal Walk] : normal gait [Balance] : balance was intact [Past-pointing] : there was no past-pointing [Tremor] : no tremor present [2+] : Ankle jerk left 2+ [Plantar Reflex Right Only] : normal on the right [Plantar Reflex Left Only] : normal on the left [Doyle] : Doyle's sign was not demonstrated [L'Hermitte's] : neck flexion did not produce tingling down the spine/arms [Spurling's - Opposite Side] : Negative Spurling's on opposite side [Spurling's Same Side] : Negative Spurling's on same side [FreeTextEntry1] : hard collar removed temporarily for range of motion testing

## 2022-04-04 NOTE — CONSULT LETTER
[Dear  ___] : Dear  [unfilled], [Consult Letter:] : I had the pleasure of evaluating your patient, [unfilled]. [Please see my note below.] : Please see my note below. [Consult Closing:] : Thank you very much for allowing me to participate in the care of this patient.  If you have any questions, please do not hesitate to contact me. [Sincerely,] : Sincerely, [FreeTextEntry2] : Eric Goldberg, MD [FreeTextEntry3] : Richard B. Libman, MD, FRCPC\par , Neurology \par Co-Director, Stroke Center\par Professor of Neurology\par [facility] [employer] [facility]\par

## 2022-04-04 NOTE — DISCUSSION/SUMMARY
[FreeTextEntry1] : Impression: The explanation for her episodes, including visual loss, numbness, weakness, loss of awareness or consciousness etc. remains uncertain, but she can be reassured that there is no evidence of stroke.  Seizures seem unlikely in view of several days of video EEG monitoring which showed no epileptiform abnormalities.  Migraine, perhaps with brainstem features  remains a possibility, but uncertain.\par \par Overall she is neurologically intact. Her workup thus far has been negative (including EEG, carotid dopplers, MRI/MRA, ILR). I suspect her symptoms may be attributed to migraine with brainstem features. I recommend she be evaluated by Dr. Danny Caceres (migraine specialist). She has been taking Riboflavin 300-500mg daily, and I have recommended she begin taking Coenzyme Q10 300mg daily for migraine prophylaxis.\par \par She has been wearing a hard cervical collar since a fall several weeks ago, as suggested by the ED at [facility].  It's unclear to me whether this collar is providing any benefit.  She should follow up with neurosurgery for further guidance regarding her cervical spine. She has been provided with a referral by Dr. Zafar.\par \par We did not explore the possible contribution of psychosocial stress to her symptoms.  If symptoms do not improve with migraine prophylaxis, then this may be an important issue to pursue.\par \par She should continue to follow up with Dr. Goldberg to manage general medical concerns. \par \par She can follow up in approximately 6 months. I hope she remains free of serious trouble.

## 2022-04-04 NOTE — ASSESSMENT
[FreeTextEntry1] : Continue to wear cervical hard collar at this time. RTO after obtaining MRI cervical spine and cervical flex and ext xrays.

## 2022-04-04 NOTE — REVIEW OF SYSTEMS
[Anxiety] : anxiety [As Noted in HPI] : as noted in HPI [Shortness Of Breath] : shortness of breath [Negative] : Heme/Lymph [FreeTextEntry6] : Follows with pulmonology- currently tapering off prednisone. When very SOB feels numbness and tingling in both legs, when stops activity numbness goes away [de-identified] : She saw hematology in November 2021, and was told she was severely anemic. She has since begun iron therapy 650mg daily; has not had any shortness of breath, numbness, or tingling since beginning iron therapy. Her hematologist felt that her degree of anemia was unlikely to cause this extent of symptoms. The cause of the anemia remains unknown. She is in the process of scheduling appointment with gastroenterology.   [FreeTextEntry1] : Reports allergy to covid vaccine (received in 6/21)- dizziness a few minutes after, hives 4 hours later, treated with prednisone aftre both doses.

## 2022-04-04 NOTE — HISTORY OF PRESENT ILLNESS
[FreeTextEntry1] : Jeniffer Lam is a 27 y/o lady\par To summarize her history:\par -In 3/21 she had an episode  of sudden vision loss in right eye, accompanied by weakness on her left side; the entire episode lasted approximately 20 minutes\par - Two days after this event (3/21), she had a syncopal event and then experienced the vision loss in right eye and left sided weakness for 20 minutes. She presented to [facility] following this event,  and neuroimaging was reportedly negative. 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 A loop recorder was implanted, and she had an extensive cardiac workup which she reports was unremarkable.\par - On 10/26/21  she was advised to go to the hospital by her cardiologist due to 30 minutes of tachy arrhythmia detected on ILR, she reports at the time her O2 saturation was in the high 80s to low 90s and she had some SOB. She was started on a steroid taper, and symptoms resolved.\par - In 12/21 she experienced 4-5  episodes at work where her coworkers report she looks like she is "spacing out". She doesn't have any recollection of the episodes\par - On 12/14/21 she had a "spacing out" episode which led to transient loss of consciousness.\par -  1/28/22-1/31/22 Admitted to EMU at [facility]: EEG revealed mild generalized slowing. No epileptiform pattern or seizures seen.\par - On 3/10/22 transient binocular vision loss. \par \par She was evaluated by KUN Black and KUN Mccullough on 11/24/21 and 1/3/22 for above symptoms.\par \par 4/4/22\par She presents to the office today wearing a cervical collar due to injury she sustained following a fall on 3/10/22. She has been experiencing intermittent left leg numbness. On 2/9/22- she had allergic angioedema following covid booster\par \par \par MRI brain (3/31/2022) to my eye was normal.\par MRA neck and head (3/31/2022) to my eye was unremarkable.\par \par MRI brain/MRA neck and head (3/31/2022) were normal. \par \par \par \par  Rheumatologist Dulce Erazo\par  PCP Eric Goldberg\par  Cardiology- Dr Tapia\par Hematology- Dr Aura Page\par

## 2022-04-04 NOTE — HISTORY OF PRESENT ILLNESS
[< 3 months] : less than 3 months [FreeTextEntry1] : Neck and shoulder pain.  [de-identified] : Ms. Lam is a 26 year old here today for a neurological evaluation. She has neck and shoulder pain post fall 3[place]10[place]  She states she felt like someone was pushing her and she fell backwards. Denies LOC. Was no direct cranial or cervical trauma.  She was evaluated at [facility] 5 days post fall.  She claims a CT scan done at [facility] did not show any fracture or subluxation. She has remained in a hard cervical collar since that evaluation.   Denies urinary or bowel incontinence.  Recent plain x-rays available today do not show any fracture or subluxation. She continues to have pain which radiates into her right upper extremity. She denies any weakness or gait instability.

## 2022-04-05 ENCOUNTER — NON-APPOINTMENT (OUTPATIENT)
Age: 27
End: 2022-04-05

## 2022-04-05 ENCOUNTER — APPOINTMENT (OUTPATIENT)
Dept: MRI IMAGING | Facility: CLINIC | Age: 27
End: 2022-04-05

## 2022-04-07 DIAGNOSIS — H54.7 UNSPECIFIED VISUAL LOSS: ICD-10-CM

## 2022-04-11 ENCOUNTER — APPOINTMENT (OUTPATIENT)
Dept: MRI IMAGING | Facility: CLINIC | Age: 27
End: 2022-04-11
Payer: MEDICAID

## 2022-04-11 ENCOUNTER — OUTPATIENT (OUTPATIENT)
Dept: OUTPATIENT SERVICES | Facility: HOSPITAL | Age: 27
LOS: 1 days | End: 2022-04-11
Payer: SELF-PAY

## 2022-04-11 ENCOUNTER — NON-APPOINTMENT (OUTPATIENT)
Age: 27
End: 2022-04-11

## 2022-04-11 DIAGNOSIS — M54.2 CERVICALGIA: ICD-10-CM

## 2022-04-11 DIAGNOSIS — Z98.890 OTHER SPECIFIED POSTPROCEDURAL STATES: Chronic | ICD-10-CM

## 2022-04-11 PROCEDURE — 72141 MRI NECK SPINE W/O DYE: CPT | Mod: 26

## 2022-04-11 PROCEDURE — 72141 MRI NECK SPINE W/O DYE: CPT

## 2022-04-14 ENCOUNTER — APPOINTMENT (OUTPATIENT)
Dept: SPINE | Facility: CLINIC | Age: 27
End: 2022-04-14
Payer: MEDICAID

## 2022-04-14 VITALS
HEART RATE: 89 BPM | HEIGHT: 63 IN | WEIGHT: 148 LBS | DIASTOLIC BLOOD PRESSURE: 78 MMHG | BODY MASS INDEX: 26.22 KG/M2 | SYSTOLIC BLOOD PRESSURE: 121 MMHG | OXYGEN SATURATION: 99 %

## 2022-04-14 PROCEDURE — 99213 OFFICE O/P EST LOW 20 MIN: CPT

## 2022-04-14 NOTE — DATA REVIEWED
[de-identified] : Cervical spine from [facility] on 4/11/2022 [de-identified] : Cervical form [facility] on 4/11/2022 no

## 2022-04-14 NOTE — ASSESSMENT
[FreeTextEntry1] : 26 year old with neck and shoulder pain. No surgical interventions. Discontinue cervical collar. Follow up with orthopedics for shoulder. RTO PRN.

## 2022-04-14 NOTE — REASON FOR VISIT
[Follow-Up: _____] : a [unfilled] follow-up visit [Other: _____] : [unfilled] [FreeTextEntry1] : Neck and shoulder pain post fall

## 2022-04-14 NOTE — HISTORY OF PRESENT ILLNESS
[FreeTextEntry1] : Pt has neck and shoulder pain post fall [place]10[place] She states she felt like someone was pushing her and she fell backwards. Denies LOC. Was no direct cranial or cervical trauma. She was evaluated at [facility] 5 days post fall. She claims a CT scan done at [facility] did not show any fracture or subluxation. She has remained in a hard cervical collar since that evaluation. Denies urinary or bowel incontinence. She denies any weakness or gait instability.

## 2022-04-19 ENCOUNTER — APPOINTMENT (OUTPATIENT)
Dept: ORTHOPEDIC SURGERY | Facility: CLINIC | Age: 27
End: 2022-04-19
Payer: MEDICAID

## 2022-04-19 VITALS — WEIGHT: 148 LBS | BODY MASS INDEX: 26.22 KG/M2 | HEIGHT: 63 IN

## 2022-04-19 PROCEDURE — 99205 OFFICE O/P NEW HI 60 MIN: CPT

## 2022-05-03 ENCOUNTER — APPOINTMENT (OUTPATIENT)
Dept: ORTHOPEDIC SURGERY | Facility: CLINIC | Age: 27
End: 2022-05-03
Payer: MEDICAID

## 2022-05-03 ENCOUNTER — APPOINTMENT (OUTPATIENT)
Dept: PAIN MANAGEMENT | Facility: CLINIC | Age: 27
End: 2022-05-03
Payer: MEDICAID

## 2022-05-03 VITALS — HEIGHT: 63 IN | BODY MASS INDEX: 26.22 KG/M2 | WEIGHT: 148 LBS

## 2022-05-03 DIAGNOSIS — M54.2 CERVICALGIA: ICD-10-CM

## 2022-05-03 PROCEDURE — 99214 OFFICE O/P EST MOD 30 MIN: CPT | Mod: 95

## 2022-05-03 PROCEDURE — 99213 OFFICE O/P EST LOW 20 MIN: CPT

## 2022-05-03 NOTE — HISTORY OF PRESENT ILLNESS
[Home] : at home, [unfilled] , at the time of the visit. [Medical Office: ([facility])___] : at the medical office located in  [Verbal consent obtained from patient] : the patient, [unfilled] [Joint Pain] : joint  [10] : a maximum pain level of 10/10 [FreeTextEntry1] : HPI\par \par Ms. ARIANE HAWKINS is a 26 year F with pmhx of multiple instance of LOC - not found to have any cv event seen by Dr. Libman and Dr. Ballard, previously seen by rheum and not found to have any diagnosable autoimmune disease presents with intermittent "generalized pain" sometimes in joints and occasionally in abdomen, as well as new right shoulder pain after LOC and fall but reports that she did not hit her right shoulder.  Seen by ortho with extensive workup and not found to have any traumatic damage or cervical radiculopathy.  \par \par Of note patient is concerned that she has Juan danlos but has yet been diagnosed\par \par \par Previous and current pain medications/doses/effects:\par \par Vicodin\par Morphine\par gabapentin\par prednisone\par \par Previous Pain Treatments:\par \par PT with mild improvement\par \par Previous Pain Injections:\par \par na\par \par Previous Diagnostic Studies/Images:\par \par MRI CS 4/22\par \par Mild reversal of the normal cervical lordosis. No listhesis.\par \par Disc spaces: Disc spaces appear normal.\par \par Spinal cord:  No cord signal abnormality seen.\par \par Paraspinal/Prevertebral soft tissues: Unremarkable\par \par Evaluation of the individual motion segments demonstrates the following:\par \par C2/3 level: No canal or neuroforaminal stenosis.\par \par C3/4 level: No canal or neuroforaminal stenosis.\par \par C4/5 level: No canal or neuroforaminal stenosis.\par \par C5/6 level: No canal or neuroforaminal stenosis.\par \par C6/7 level: No canal or neuroforaminal stenosis.\par \par C7/T1 level: No canal or neuroforaminal stenosis.\par \par Impression:\par \par No acute injury within the cervical spine. No findings to explain the patient's acute neck pain.\par \par

## 2022-05-03 NOTE — ASSESSMENT
[FreeTextEntry1] : >> Imaging and Other Studies\par \par I personally reviewed the relevant imaging.  Discussed and explained to patient the likely source of pathology and pain.  Questions answered. MRI\par \par >> Therapy and Other Modalities\par \par restart PT - referral provided\par \par >> Medications\par  \par may consider cymbalta \par \par may consider tizanidine\par \par acetaminophen 650mg q8h prn pain (caution <3g daily)\par \par >> Interventions\par \par na\par \par >> Consults\par \par syncopal episodes, intermittent pain, numbness may raise suspicion for MS - will refer to neurology for evaluation\par \par referral to rheum for 2nd opinion on connective tissue disease\par \par >> Discussion of Risks/Benefits/Alternatives\par \par 	>Regarding any scheduled procedures:\par \par I have discussed in detail with the patient that any interventional pain procedure is associated with potential risks.  The procedure may include an injection of steroids and potentially other medications (local anesthetic and normal saline) into the epidural space or surrounding tissue of the spine.  There are significant risks of this procedure which include and are not limited to infection, bleeding, worsening pain, dural puncture leading to postdural puncture headache, nerve damage, spinal cord injury, paralysis, stroke, and death.  \par \par There is a chance that the procedure does not improve their pain.  \par \par There are risks associated with the steroid being absorbed into the body systemically.  These include dysphoria, difficulty sleeping, mood swings and personality changes.  Premenopausal women may notice an irregularity in her menstrual cycle for 2-3 months following the injection.  Steroids can specifically affect patients with hypertension, diabetes, and peptic ulcers.  The procedure may cause a temporary increase in blood pressure and blood pressure, and may adversely affect a peptic ulcer.  Other, more rare complications, include avascular necrosis of joints, glaucoma and worsening of osteoporosis. \par \par I have discussed the risks of the procedure at length with the patient, and the potential benefits of pain relief.  I have offered alternatives to the procedure.  All questions were answered.  \par \par The patient expressed understanding and wishes to proceed with the procedure.\par \par 	>Regarding COVID19 Pandemic: \par \par Any planned interventional pain procedure are scheduled because further delay may cause harm or negative outcome to patient.  The goal in performing this procedure is to avoid deterioration of function, emergency room visits (which increases exposure) and reliance on opioids.  \par \par r/b/a discussed with patient, lack of evidence to conclusively determine whether pain management procedures have any positive or negative impact on the possibility of wilma the virus and/or development of any sequelae. \par \par Patient counselled regarding timing steroid based intervention 2 weeks before or after COVID-19 vaccine administration to avoid any interaction or affect on efficacy of vaccination\par \par Patient demonstrates understanding\par \par Informed patient that risks associated with the COVID-19 infection.  Informed patient steps taken to limit the risks.  We are implementing safety precautions and following protocols consistent with the CDC and state recommendations. All patients and staff will be checked for fever or signs of illness upon entry to the facility. We will limit our steroid dose to the lowest effective therapeutic dose or in some cases steroids will not be injected at all. \par \par Patient agrees to proceed\par \par >> Conclusion\par \par The above diagnosis and treatment plan is medically reasonable and necessary based on the patient encounter \par There were no barriers to communication.\par Informed patient that I would be available for any additional questions.\par Patient was instructed to call with any worsening symptoms including severe pain, new numbness/weakness, or changes in the bowel/bladder function. \par Discussed role of nsaids in pain management and all relevant risks, if patient is continuing to require after 4 weeks the patient should f/u for alternative treatment. \par Instructed patient to maintain pain diary to monitor pain level, mobility, and function.\par \par I explained to patient benefits and limitation of TeleMedicine visits\par \par Patient understands that limitations include inability to perform comprehensive physical exam, which may lead to potential diagnostic inconsistencies.  \par \par Any scheduled procedures are based on history, imaging and limited physical exam performed on TeleHealth visit.  If necessary, additional focal physical exam will be performed on date of procedure\par \par Patient understands that diagnosis and treatment may be limited by these inconsistencies and patient agrees to proceed with care plan\par \par \par

## 2022-05-03 NOTE — PHYSICAL EXAM
[de-identified] : \par Constitutional: Normal, well developed, no acute distress on audio/video examination\par Eyes: Symmetric, External structures on video examination\par ENT: Lips, mucosa and tongue normal on video examination\par Oropharynx: Lips normal, symmetric, no external lesions appreciated appreciated on video examination\par Respiratory: Non-labored breathing, no audible wheezes appreciated on audio/video examination\par Vascular: No cyanosis appreciated or edema appreciated on video examination\par GI:  no jaundice appreciated on video examination\par Neurovascular: CN grossly intact on video/audio examination, alert\par MSK: Normal muscle bulk on video examination\par

## 2022-05-03 NOTE — REASON FOR VISIT
[Initial Consultation] : an initial pain management consultation [FreeTextEntry2] : back and shoulder pain

## 2022-05-05 ENCOUNTER — APPOINTMENT (OUTPATIENT)
Dept: THORACIC SURGERY | Facility: CLINIC | Age: 27
End: 2022-05-05
Payer: MEDICAID

## 2022-05-05 VITALS
DIASTOLIC BLOOD PRESSURE: 76 MMHG | OXYGEN SATURATION: 98 % | HEART RATE: 103 BPM | BODY MASS INDEX: 27.63 KG/M2 | RESPIRATION RATE: 18 BRPM | WEIGHT: 156 LBS | TEMPERATURE: 98.5 F | SYSTOLIC BLOOD PRESSURE: 121 MMHG

## 2022-05-05 DIAGNOSIS — Z86.2 PERSONAL HISTORY OF DISEASES OF THE BLOOD AND BLOOD-FORMING ORGANS AND CERTAIN DISORDERS INVOLVING THE IMMUNE MECHANISM: ICD-10-CM

## 2022-05-05 DIAGNOSIS — Z82.3 FAMILY HISTORY OF STROKE: ICD-10-CM

## 2022-05-05 DIAGNOSIS — Z87.39 PERSONAL HISTORY OF OTHER DISEASES OF THE MUSCULOSKELETAL SYSTEM AND CONNECTIVE TISSUE: ICD-10-CM

## 2022-05-05 DIAGNOSIS — R55 SYNCOPE AND COLLAPSE: ICD-10-CM

## 2022-05-05 DIAGNOSIS — M25.511 PAIN IN RIGHT SHOULDER: ICD-10-CM

## 2022-05-05 DIAGNOSIS — K90.0 CELIAC DISEASE: ICD-10-CM

## 2022-05-05 PROCEDURE — 99205 OFFICE O/P NEW HI 60 MIN: CPT

## 2022-05-07 PROBLEM — K90.0 ADULT CELIAC DISEASE: Status: RESOLVED | Noted: 2022-05-07 | Resolved: 2022-05-07

## 2022-05-07 PROBLEM — R55 SYNCOPE, UNSPECIFIED SYNCOPE TYPE: Status: ACTIVE | Noted: 2021-12-18

## 2022-05-07 PROBLEM — M25.511 RIGHT SHOULDER PAIN: Status: ACTIVE | Noted: 2022-04-18

## 2022-05-07 PROBLEM — Z82.3 FAMILY HISTORY OF CEREBROVASCULAR ACCIDENT (CVA): Status: ACTIVE | Noted: 2022-05-07

## 2022-05-07 PROBLEM — Z86.2 HISTORY OF ANEMIA: Status: RESOLVED | Noted: 2022-05-07 | Resolved: 2022-05-07

## 2022-05-07 PROBLEM — Z87.39 HISTORY OF SCOLIOSIS: Status: RESOLVED | Noted: 2022-05-07 | Resolved: 2022-05-07

## 2022-05-07 RX ORDER — CYCLOBENZAPRINE HYDROCHLORIDE 10 MG/1
10 TABLET, FILM COATED ORAL
Qty: 30 | Refills: 0 | Status: ACTIVE | COMMUNITY
Start: 2022-03-03

## 2022-05-07 RX ORDER — CLINDAMYCIN HYDROCHLORIDE 300 MG/1
300 CAPSULE ORAL
Qty: 12 | Refills: 0 | Status: DISCONTINUED | COMMUNITY
Start: 2022-03-31

## 2022-05-07 RX ORDER — CIPROFLOXACIN HYDROCHLORIDE 500 MG/1
500 TABLET, FILM COATED ORAL
Qty: 20 | Refills: 0 | Status: DISCONTINUED | COMMUNITY
Start: 2021-12-12

## 2022-05-07 RX ORDER — CETIRIZINE HYDROCHLORIDE ORAL SOLUTION 5 MG/5ML
1 SOLUTION ORAL
Qty: 70 | Refills: 0 | Status: DISCONTINUED | COMMUNITY
Start: 2022-02-16

## 2022-05-07 RX ORDER — DEXMETHYLPHENIDATE HYDROCHLORIDE 10 MG/1
10 TABLET ORAL
Qty: 60 | Refills: 0 | Status: ACTIVE | COMMUNITY
Start: 2021-11-16

## 2022-05-07 RX ORDER — ONDANSETRON 4 MG/1
4 TABLET, ORALLY DISINTEGRATING ORAL
Qty: 15 | Refills: 0 | Status: DISCONTINUED | COMMUNITY
Start: 2022-01-21

## 2022-05-07 RX ORDER — AZITHROMYCIN 250 MG/1
250 TABLET, FILM COATED ORAL
Qty: 6 | Refills: 0 | Status: DISCONTINUED | COMMUNITY
Start: 2021-11-08

## 2022-05-07 RX ORDER — AZELASTINE HYDROCHLORIDE 137 UG/1
137 SPRAY, METERED NASAL
Qty: 30 | Refills: 0 | Status: ACTIVE | COMMUNITY
Start: 2022-03-28

## 2022-05-07 RX ORDER — ALBUTEROL SULFATE 90 UG/1
108 (90 BASE) INHALANT RESPIRATORY (INHALATION)
Qty: 18 | Refills: 0 | Status: ACTIVE | COMMUNITY
Start: 2021-08-26

## 2022-05-07 RX ORDER — OMEPRAZOLE 40 MG/1
40 CAPSULE, DELAYED RELEASE ORAL
Qty: 30 | Refills: 0 | Status: DISCONTINUED | COMMUNITY
Start: 2022-02-24

## 2022-05-07 RX ORDER — PREDNISONE 20 MG/1
20 TABLET ORAL
Qty: 7 | Refills: 0 | Status: DISCONTINUED | COMMUNITY
Start: 2022-02-08

## 2022-05-07 RX ORDER — CETIRIZINE HYDROCHLORIDE 10 MG/1
10 TABLET, COATED ORAL
Qty: 30 | Refills: 0 | Status: DISCONTINUED | COMMUNITY
Start: 2022-02-11

## 2022-05-07 RX ORDER — MONTELUKAST 10 MG/1
10 TABLET, FILM COATED ORAL
Qty: 30 | Refills: 0 | Status: DISCONTINUED | COMMUNITY
Start: 2022-02-17

## 2022-05-07 NOTE — ASSESSMENT
[FreeTextEntry1] : 27 y/o female, never smoker, with PMhx of multiple syncopes. On 3/10/2022, she had one episode of syncope and [place], she states she has neck and right shoulder pain since then. The pain is significant and persistent, pain scale 6-7/10. She have seen neurology, neurosx, pain management, had MRI done, which were all negative findings. 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 \par \par MRI cervical spine on 4/11/2022:\par - no acute injury within the cervical spine. \par \par MR angio fo neck w w/o contrast on 3/31/2022:\par - normal MRA of the extracranial circulation. No evidence of carotic stenosis.\par \par MRI head w w/o contrast on 3/31/2022:\par - normal MRI brain, no evidence of acute infarction. \par \par Right shoulder MRI w contrast on 4/29/2022 at [facility]:\par - unremarkable MRI arthrogram of the right shoulder. No findings to explain the pt's acute neck pain. \par \par \par I have reviewed the patient's medical records and diagnostic images at the time of this office consultation and have made the following recommendation.\par Plan:\par 1. MRA & MRV of right shoulder ( with abduction and adduction).\par 2. Obtain venous doppler report from [facility]. \par 3. RTC after above done. \par \par \par \par \par I personally performed the services described in the documentation, reviewed the documentation recorded by the scribe in my presence and it accurately and completely records my words and actions.\par \par I, Taylor Branch NP, am scribing for and the presence of REBEKAH Marques, the following sections HISTORY OF PRESENT ILLNESS, PAST MEDICAL/FAMILY/SOCIAL HISTORY; REVIEW OF SYSTEMS; VITAL SIGNS; PHYSICAL EXAM; DISPOSITION.\par \par

## 2022-05-07 NOTE — HISTORY OF PRESENT ILLNESS
[FreeTextEntry1] : 27 y/o female, never smoker, with PMhx of multiple syncopes. On 3/10/2022, she had one episode of syncope and [place], she states she has neck and right shoulder pain since then. The pain is significant and persistent, pain scale 6-7/10. She have seen neurology, neurosx, pain management, had MRI done, which were all negative findings. 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 \par \par MRI cervical spine on 4/11/2022:\par - no acute injury within the cervical spine. \par \par MR angio fo neck w w/o contrast on 3/31/2022:\par - normal MRA of the extracranial circulation. No evidence of carotic stenosis.\par \par MRI head w w/o contrast on 3/31/2022:\par - normal MRI brain, no evidence of acute infarction. \par \par Right shoulder MRI w contrast on 4/29/2022 at [facility]:\par - unremarkable MRI arthrogram of the right shoulder. No findings to explain the pt's acute neck pain. \par \par She is here for CT sx consultation, referred by Dr. Zafar. She c/o right shoulder and arm pain, unable to lift up, with limited ROM, associated with numbness. Denies fever, chills, sob, CP. She is taking pain mediations for the shoulder pain. \par

## 2022-05-07 NOTE — PHYSICAL EXAM
[General Appearance - Alert] : alert [General Appearance - In No Acute Distress] : in no acute distress [General Appearance - Well Nourished] : well nourished [PERRL With Normal Accommodation] : pupils were equal in size, round, and reactive to light [Sclera] : the sclera and conjunctiva were normal [Outer Ear] : the ears and nose were normal in appearance [Hearing Threshold Finger Rub Not [place]] : hearing was normal [Neck Appearance] : the appearance of the neck was normal [Neck Cervical Mass (___cm)] : no neck mass was observed [Respiration, Rhythm And Depth] : normal respiratory rhythm and effort [Auscultation Breath Sounds / Voice Sounds] : lungs were clear to auscultation bilaterally [Heart Rate And Rhythm] : heart rate was normal and rhythm regular [Heart Sounds] : normal S1 and S2 [Examination Of The Chest] : the chest was normal in appearance [Chest Visual Inspection Thoracic Asymmetry] : no chest asymmetry [2+] : left 2+ [No Abnormalities] : the abdominal aorta was not enlarged and no bruit was heard [No Pulse Delay] : no pulse delay [No Pulse Discrepancy] : no pulse discrepancy detected [Bowel Sounds] : normal bowel sounds [Abdomen Soft] : soft [Abdomen Tenderness] : non-tender [Cervical Lymph Nodes Enlarged Posterior Bilaterally] : posterior cervical [Cervical Lymph Nodes Enlarged Anterior Bilaterally] : anterior cervical [Supraclavicular Lymph Nodes Enlarged Bilaterally] : supraclavicular [No CVA Tenderness] : no ~M costovertebral angle tenderness [No Spinal Tenderness] : no spinal tenderness [Nail Clubbing] : no clubbing  or cyanosis of the fingernails [Involuntary Movements] : no involuntary movements were seen [Musculoskeletal - Swelling] : no joint swelling seen [Skin Color & Pigmentation] : normal skin color and pigmentation [Skin Turgor] : normal skin turgor [Sensation] : the sensory exam was normal to light touch and pinprick [] : no rash [Motor Exam] : the motor exam was normal [No Focal Deficits] : no focal deficits [Oriented To Time, Place, And Person] : oriented to person, place, and time [Mood] : the mood was normal [Affect] : the affect was normal [Fingers] :  capillary refill of the fingers was normal

## 2022-05-14 ENCOUNTER — APPOINTMENT (OUTPATIENT)
Dept: MRI IMAGING | Facility: CLINIC | Age: 27
End: 2022-05-14

## 2022-05-18 ENCOUNTER — APPOINTMENT (OUTPATIENT)
Dept: MRI IMAGING | Facility: CLINIC | Age: 27
End: 2022-05-18

## 2022-05-19 ENCOUNTER — RESULT REVIEW (OUTPATIENT)
Age: 27
End: 2022-05-19

## 2022-05-19 ENCOUNTER — OUTPATIENT (OUTPATIENT)
Dept: OUTPATIENT SERVICES | Facility: HOSPITAL | Age: 27
LOS: 1 days | End: 2022-05-19

## 2022-05-19 ENCOUNTER — APPOINTMENT (OUTPATIENT)
Dept: MRI IMAGING | Facility: CLINIC | Age: 27
End: 2022-05-19
Payer: MEDICAID

## 2022-05-19 ENCOUNTER — APPOINTMENT (OUTPATIENT)
Dept: PAIN MANAGEMENT | Facility: CLINIC | Age: 27
End: 2022-05-19
Payer: MEDICAID

## 2022-05-19 DIAGNOSIS — G89.29 OTHER CHRONIC PAIN: ICD-10-CM

## 2022-05-19 DIAGNOSIS — Z98.890 OTHER SPECIFIED POSTPROCEDURAL STATES: Chronic | ICD-10-CM

## 2022-05-19 DIAGNOSIS — C73 MALIGNANT NEOPLASM OF THYROID GLAND: ICD-10-CM

## 2022-05-19 PROCEDURE — 99443: CPT

## 2022-05-19 PROCEDURE — 71552 MRI CHEST W/O & W/DYE: CPT | Mod: 26

## 2022-05-19 NOTE — HISTORY OF PRESENT ILLNESS
[Home] : at home, [unfilled] , at the time of the visit. [Medical Office: ([facility])___] : at the medical office located in  [Verbal consent obtained from patient] : the patient, [unfilled] [Joint Pain] : joint  [10] : a maximum pain level of 10/10 [FreeTextEntry1] : Interval Note:\par Patient called to inform us that she has been diagnosed with papillary thyroid ca by Dr. Read at [facility] and Dr. Bhaskar Viera at [facility].  Patient reports that she has not decided which path she would like to pursue in terms of treatment and is still looking to round out her care team. \par Patient reports that she is very concerned about postoperative pain  management needs, and is concerned that she may not be able to obtain pain medications after surgery. Denies any additional weakness, numbness, bowel/bladder dysfunction.  Pain intensity is\par \par \par HPI\par \par Ms. ARIANE HAWKINS is a 26 year F with pmhx of multiple instance of LOC - not found to have any cv event seen by Dr. Libman and Dr. Ballard, previously seen by rheum and not found to have any diagnosable autoimmune disease presents with intermittent "generalized pain" sometimes in joints and occasionally in abdomen, as well as new right shoulder pain after LOC and fall but reports that she did not hit her right shoulder.  Seen by ortho with extensive workup and not found to have any traumatic damage or cervical radiculopathy.  \par \par Of note patient is concerned that she has Juan danlos but has yet been diagnosed\par \par \par Previous and current pain medications/doses/effects:\par \par Vicodin\par Morphine\par gabapentin\par prednisone\par \par Previous Pain Treatments:\par \par PT with mild improvement\par \par Previous Pain Injections:\par \par na\par \par Previous Diagnostic Studies/Images:\par \par MRI CS 4/22\par \par Mild reversal of the normal cervical lordosis. No listhesis.\par \par Disc spaces: Disc spaces appear normal.\par \par Spinal cord:  No cord signal abnormality seen.\par \par Paraspinal/Prevertebral soft tissues: Unremarkable\par \par Evaluation of the individual motion segments demonstrates the following:\par \par C2/3 level: No canal or neuroforaminal stenosis.\par \par C3/4 level: No canal or neuroforaminal stenosis.\par \par C4/5 level: No canal or neuroforaminal stenosis.\par \par C5/6 level: No canal or neuroforaminal stenosis.\par \par C6/7 level: No canal or neuroforaminal stenosis.\par \par C7/T1 level: No canal or neuroforaminal stenosis.\par \par Impression:\par \par No acute injury within the cervical spine. No findings to explain the patient's acute neck pain.\par \par

## 2022-05-19 NOTE — ASSESSMENT
[FreeTextEntry1] : >> Imaging and Other Studies\par \par I personally reviewed the relevant imaging.  Discussed and explained to patient the likely source of pathology and pain.  Questions answered. MRI\par \par >> Therapy and Other Modalities\par \par continue PT for chronic pain\par \par >> Medications\par  \par may consider cymbalta \par \par may consider tizanidine\par \par acetaminophen 650mg q8h prn pain (caution <3g daily)\par \par Patient concerned about possible postoperative pain.  I assured patient that perioperative pain course is currently ideal as patient is not on any opioids at the moment.  She reports that she may elect not to have surgery if postoperative course would be too painful.  I informed patient that appropriate and timely treatment of possible cancer is essential as we will treat pain appropriately postoperatively.  We are unable to predict her postoperative pain.  I offered to put her in touch with appropriate oncologic, surgical and pain team that is local to her via network care (oncology, endocrine surgery, pain management)\par \par >> Interventions\par \par na\par \par >> Consults\par \par syncopal episodes, intermittent pain, numbness may raise suspicion for MS - will refer to neurology for evaluation\par \par Patient with reported new diagnosis of thyroid ca followed at [facility].  I have called Dr. Read's office regarding diagnosis - he reported that he is unaware of the cancer diagnosis but had sent her to Dr. Viera for evaluation of very small thyroid nodule \par \par At patient's request I will put her in touch with [facility] team via network care including oncology, endocrine surgery and pain management\par \par I called Dr. Viera's office but was placed on infinite hold\par \par >> Discussion of Risks/Benefits/Alternatives\par \par 	>Regarding any scheduled procedures:\par \par I have discussed in detail with the patient that any interventional pain procedure is associated with potential risks.  The procedure may include an injection of steroids and potentially other medications (local anesthetic and normal saline) into the epidural space or surrounding tissue of the spine.  There are significant risks of this procedure which include and are not limited to infection, bleeding, worsening pain, dural puncture leading to postdural puncture headache, nerve damage, spinal cord injury, paralysis, stroke, and death.  \par \par There is a chance that the procedure does not improve their pain.  \par \par There are risks associated with the steroid being absorbed into the body systemically.  These include dysphoria, difficulty sleeping, mood swings and personality changes.  Premenopausal women may notice an irregularity in her menstrual cycle for 2-3 months following the injection.  Steroids can specifically affect patients with hypertension, diabetes, and peptic ulcers.  The procedure may cause a temporary increase in blood pressure and blood pressure, and may adversely affect a peptic ulcer.  Other, more rare complications, include avascular necrosis of joints, glaucoma and worsening of osteoporosis. \par \par I have discussed the risks of the procedure at length with the patient, and the potential benefits of pain relief.  I have offered alternatives to the procedure.  All questions were answered.  \par \par The patient expressed understanding and wishes to proceed with the procedure.\par \par 	>Regarding COVID19 Pandemic: \par \par Any planned interventional pain procedure are scheduled because further delay may cause harm or negative outcome to patient.  The goal in performing this procedure is to avoid deterioration of function, emergency room visits (which increases exposure) and reliance on opioids.  \par \par r/b/a discussed with patient, lack of evidence to conclusively determine whether pain management procedures have any positive or negative impact on the possibility of wilma the virus and/or development of any sequelae. \par \par Patient counselled regarding timing steroid based intervention 2 weeks before or after COVID-19 vaccine administration to avoid any interaction or affect on efficacy of vaccination\par \par Patient demonstrates understanding\par \par Informed patient that risks associated with the COVID-19 infection.  Informed patient steps taken to limit the risks.  We are implementing safety precautions and following protocols consistent with the CDC and state recommendations. All patients and staff will be checked for fever or signs of illness upon entry to the facility. We will limit our steroid dose to the lowest effective therapeutic dose or in some cases steroids will not be injected at all. \par \par Patient agrees to proceed\par \par >> Conclusion\par \par The above diagnosis and treatment plan is medically reasonable and necessary based on the patient encounter \par There were no barriers to communication.\par Informed patient that I would be available for any additional questions.\par Patient was instructed to call with any worsening symptoms including severe pain, new numbness/weakness, or changes in the bowel/bladder function. \par Discussed role of nsaids in pain management and all relevant risks, if patient is continuing to require after 4 weeks the patient should f/u for alternative treatment. \par Instructed patient to maintain pain diary to monitor pain level, mobility, and function.\par \par I explained to patient benefits and limitation of TeleMedicine visits\par \par Patient understands that limitations include inability to perform comprehensive physical exam, which may lead to potential diagnostic inconsistencies.  \par \par Any scheduled procedures are based on history, imaging and limited physical exam performed on TeleHealth visit.  If necessary, additional focal physical exam will be performed on date of procedure\par \par Patient understands that diagnosis and treatment may be limited by these inconsistencies and patient agrees to proceed with care plan\par \par \par

## 2022-05-26 ENCOUNTER — APPOINTMENT (OUTPATIENT)
Dept: THORACIC SURGERY | Facility: CLINIC | Age: 27
End: 2022-05-26
Payer: MEDICAID

## 2022-05-26 PROCEDURE — 99443: CPT

## 2022-05-26 NOTE — HISTORY OF PRESENT ILLNESS
[FreeTextEntry1] : 25 y/o female, never smoker, with PMhx of multiple syncopes. On 3/10/2022, she had one episode of syncope and [place], she states she has neck and right shoulder pain since then. The pain is significant and persistent, pain scale 6-7/10. She have seen neurology, neurosx, pain management, had MRI done, which were all negative findings. 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 \par \par MRI cervical spine on 4/11/2022:\par - no acute injury within the cervical spine. \par \par MR angio fo neck w w/o contrast on 3/31/2022:\par - normal MRA of the extracranial circulation. No evidence of carotic stenosis.\par \par MRI head w w/o contrast on 3/31/2022:\par - normal MRI brain, no evidence of acute infarction. \par \par Right shoulder MRI w contrast on 4/29/2022 at [facility]:\par - unremarkable MRI arthrogram of the right shoulder. No findings to explain the pt's acute neck pain. \par \par MRA/MRV of chest on 5/19/22:\par - symmetric subclavian vein narrowing bilaterally w/ abduction, unlikely representing venous thoracic outlet syndrome, it is likely a normal variant\par - no evidence of arterial thoracic outlet syndrome\par \par Patient is followed today via Telephonic visit. She says will have thyroid surgery for thyroid Ca, still c/o right shoulder and arm pain and numbness. Denies fever, chills, cough, SOB, Cp. \par

## 2022-05-26 NOTE — ASSESSMENT
[FreeTextEntry1] : 25 y/o female, never smoker, with PMhx of multiple syncopes. On 3/10/2022, she had one episode of syncope and [place], she states she has neck and right shoulder pain since then. The pain is significant and persistent, pain scale 6-7/10. She have seen neurology, neurosx, pain management, had MRI done, which were all negative findings. 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 \par \par MRI cervical spine on 4/11/2022:\par - no acute injury within the cervical spine. \par \par MR angio fo neck w w/o contrast on 3/31/2022:\par - normal MRA of the extracranial circulation. No evidence of carotic stenosis.\par \par MRI head w w/o contrast on 3/31/2022:\par - normal MRI brain, no evidence of acute infarction. \par \par Right shoulder MRI w contrast on 4/29/2022 at [facility]:\par - unremarkable MRI arthrogram of the right shoulder. No findings to explain the pt's acute neck pain. \par \par MRA/MRV of chest on 5/19/22:\par - symmetric subclavian vein narrowing bilaterally w/ abduction, unlikely representing venous thoracic outlet syndrome, it is likely a normal variant\par - no evidence of arterial thoracic outlet syndrome\par \par She says will have thyroid surgery for thyroid Ca, still c/o right shoulder and arm pain and numbness.\par \par \par I have reviewed the patient's medical records and diagnostic images at the time of this office consultation and have made the following recommendation.\par Plan:\par 1. no apparent evidence of thoracic outlet syndrome. Recommended her to proceed with thyroid surgery for thyroid Ca at this time. \par 2. RTC post thyroid sx. \par \par \par \par I personally performed the services described in the documentation, reviewed the documentation recorded by the scribe in my presence and it accurately and completely records my words and actions.\par \par I, Taylor Branch NP, am scribing for and the presence of REBEKAH Marques, the following sections HISTORY OF PRESENT ILLNESS, PAST MEDICAL/FAMILY/SOCIAL HISTORY; REVIEW OF SYSTEMS; VITAL SIGNS; PHYSICAL EXAM; DISPOSITION.\par \par

## 2022-05-26 NOTE — REASON FOR VISIT
[Follow-Up: _____] : a [unfilled] follow-up visit [Home] : at home, [unfilled] , at the time of the visit. [Medical Office: ([facility])___] : at the medical office located in  [Verbal consent obtained from patient] : the patient, [unfilled]

## 2022-06-16 ENCOUNTER — APPOINTMENT (OUTPATIENT)
Dept: THORACIC SURGERY | Facility: CLINIC | Age: 27
End: 2022-06-16
Payer: MEDICAID

## 2022-06-17 ENCOUNTER — NON-APPOINTMENT (OUTPATIENT)
Age: 27
End: 2022-06-17

## 2022-06-22 ENCOUNTER — RESULT REVIEW (OUTPATIENT)
Age: 27
End: 2022-06-22

## 2022-06-23 ENCOUNTER — APPOINTMENT (OUTPATIENT)
Dept: THORACIC SURGERY | Facility: CLINIC | Age: 27
End: 2022-06-23
Payer: MEDICAID

## 2022-06-23 VITALS
SYSTOLIC BLOOD PRESSURE: 118 MMHG | RESPIRATION RATE: 18 BRPM | DIASTOLIC BLOOD PRESSURE: 74 MMHG | WEIGHT: 160 LBS | HEART RATE: 80 BPM | BODY MASS INDEX: 28.34 KG/M2 | TEMPERATURE: 96.9 F | OXYGEN SATURATION: 99 %

## 2022-06-23 DIAGNOSIS — R20.0 PAIN IN RIGHT ARM: ICD-10-CM

## 2022-06-23 DIAGNOSIS — M79.601 PAIN IN RIGHT ARM: ICD-10-CM

## 2022-06-23 PROCEDURE — 99214 OFFICE O/P EST MOD 30 MIN: CPT

## 2022-06-24 RX ORDER — ACETAMINOPHEN 160 MG/5ML
160 LIQUID ORAL
Qty: 473 | Refills: 0 | Status: ACTIVE | COMMUNITY
Start: 2022-06-21

## 2022-06-24 RX ORDER — MECLIZINE HCL 25 MG/1
25 TABLET ORAL
Qty: 15 | Refills: 0 | Status: ACTIVE | COMMUNITY
Start: 2022-01-21

## 2022-06-24 RX ORDER — EPINEPHRINE 0.3 MG/.3ML
0.3 INJECTION INTRAMUSCULAR
Qty: 2 | Refills: 0 | Status: ACTIVE | COMMUNITY
Start: 2022-02-24

## 2022-06-24 RX ORDER — HYDROMORPHONE HYDROCHLORIDE 2 MG/1
2 TABLET ORAL
Qty: 15 | Refills: 0 | Status: ACTIVE | COMMUNITY
Start: 2022-06-01

## 2022-06-24 RX ORDER — ONDANSETRON 8 MG/1
8 TABLET ORAL
Qty: 10 | Refills: 0 | Status: ACTIVE | COMMUNITY
Start: 2022-05-17

## 2022-06-24 RX ORDER — CALCIUM CARBONATE 500 MG/1
500 TABLET, CHEWABLE ORAL
Qty: 120 | Refills: 0 | Status: ACTIVE | COMMUNITY
Start: 2022-05-31

## 2022-06-24 RX ORDER — LEVOTHYROXINE SODIUM 88 UG/1
88 TABLET ORAL
Qty: 30 | Refills: 0 | Status: ACTIVE | COMMUNITY
Start: 2022-06-21

## 2022-06-24 RX ORDER — LEVOTHYROXINE SODIUM 0.11 MG/1
112 TABLET ORAL
Qty: 30 | Refills: 0 | Status: ACTIVE | COMMUNITY
Start: 2022-05-31

## 2022-06-24 RX ORDER — IPRATROPIUM BROMIDE AND ALBUTEROL SULFATE 2.5; .5 MG/3ML; MG/3ML
0.5-2.5 (3) SOLUTION RESPIRATORY (INHALATION)
Qty: 180 | Refills: 0 | Status: ACTIVE | COMMUNITY
Start: 2022-02-16

## 2022-06-24 RX ORDER — METHOCARBAMOL 500 MG/1
500 TABLET, FILM COATED ORAL
Qty: 15 | Refills: 0 | Status: ACTIVE | COMMUNITY
Start: 2022-03-18

## 2022-06-24 RX ORDER — METHYLPREDNISOLONE 4 MG/1
4 TABLET ORAL
Qty: 21 | Refills: 0 | Status: ACTIVE | COMMUNITY
Start: 2022-02-12

## 2022-06-24 RX ORDER — CALCIUM CARBONATE 500(1250)
500 TABLET ORAL
Qty: 270 | Refills: 0 | Status: ACTIVE | COMMUNITY
Start: 2022-06-01

## 2022-06-24 RX ORDER — FERROUS SULFATE TAB 325 MG (65 MG ELEMENTAL FE) 325 (65 FE) MG
325 (65 FE) TAB ORAL
Qty: 60 | Refills: 0 | Status: ACTIVE | COMMUNITY
Start: 2021-12-22

## 2022-06-24 RX ORDER — MORPHINE SULFATE 15 MG/1
15 TABLET ORAL
Qty: 12 | Refills: 0 | Status: ACTIVE | COMMUNITY
Start: 2022-06-14

## 2022-06-24 RX ORDER — METOPROLOL TARTRATE 25 MG/1
25 TABLET, FILM COATED ORAL
Qty: 30 | Refills: 0 | Status: ACTIVE | COMMUNITY
Start: 2022-02-20

## 2022-06-24 NOTE — ASSESSMENT
[FreeTextEntry1] : 25 y/o female, never smoker, with PMhx of multiple syncopes. On 3/10/2022, she had one episode of syncope and [place], she states she has neck and right shoulder pain since then. The pain is significant and persistent, pain scale 6-7/10. She have seen neurology, neurosx, pain management, had MRI done, which were all negative findings. \par \par Right shoulder MRI w contrast on 4/29/2022 at [facility]:\par - unremarkable MRI arthrogram of the right shoulder. No findings to explain the pt's acute neck pain. \par \par MRA/MRV of chest on 5/19/22:\par - symmetric subclavian vein narrowing bilaterally w/ abduction, unlikely representing venous thoracic outlet syndrome, it is likely a normal variant\par - no evidence of arterial thoracic outlet syndrome\par \par I have reviewed the patient's medical records and diagnostic images at time of this office consultation and have made the following recommendation:\par 1. MRA/MRV showed no evidence of thoracic outlet syndrome, I recommended patient to follow up with Neuro\par 2. RTC as needed.\par \par \par I personally performed the services described in the documentation, reviewed the documentation recorded by the scribe in my presence and it accurately and completely records my words and actions.\par \par I, Lorena Santiago NP, am scribing for and the presence of REBEKAH Marques, the following sections HISTORY OF PRESENT ILLNESS, PAST MEDICAL/FAMILY/SOCIAL HISTORY; REVIEW OF SYSTEMS; VITAL SIGNS; PHYSICAL EXAM; DISPOSITION.\par \par \par

## 2022-06-24 NOTE — HISTORY OF PRESENT ILLNESS
[FreeTextEntry1] : 25 y/o female, never smoker, with PMhx of multiple syncopes. On 3/10/2022, she had one episode of syncope and [place], she states she has neck and right shoulder pain since then. The pain is significant and persistent, pain scale 6-7/10. She have seen neurology, neurosx, pain management, had MRI done, which were all negative findings. 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 \par \par MRI cervical spine on 4/11/2022:\par - no acute injury within the cervical spine. \par \par MR angio of neck w w/o contrast on 3/31/2022:\par - normal MRA of the extracranial circulation. No evidence of carotic stenosis.\par \par MRI head w w/o contrast on 3/31/2022:\par - normal MRI brain, no evidence of acute infarction. \par \par Right shoulder MRI w contrast on 4/29/2022 at [facility]:\par - unremarkable MRI arthrogram of the right shoulder. No findings to explain the pt's acute neck pain. \par \par MRA/MRV of chest on 5/19/22:\par - symmetric subclavian vein narrowing bilaterally w/ abduction, unlikely representing venous thoracic outlet syndrome, it is likely a normal variant\par - no evidence of arterial thoracic outlet syndrome\par \par Patient was seen on 05/26/2022, no apparent evidence of thoracic outlet syndrome. Recommended her to proceed with thyroid surgery for thyroid Ca at this time and RTC post thyroid sx. \par \par Patient is s/p thyroidectomy on 5/31/22 at [facility].\par \par Patient is here today for a follow up. Recovered well from thyroid sx. Pt c/o Rt shoulder pain and Rt arm numbness.

## 2022-06-24 NOTE — PHYSICAL EXAM
[] : no respiratory distress [Auscultation Breath Sounds / Voice Sounds] : lungs were clear to auscultation bilaterally [Heart Rate And Rhythm] : heart rate was normal and rhythm regular [Heart Sounds] : normal S1 and S2 [Heart Sounds Gallop] : no gallops [Murmurs] : no murmurs [Heart Sounds Pericardial Friction Rub] : no pericardial rub [Examination Of The Chest] : the chest was normal in appearance [Chest Visual Inspection Thoracic Asymmetry] : no chest asymmetry [Diminished Respiratory Excursion] : normal chest expansion [2+] : left 2+

## 2022-06-30 ENCOUNTER — APPOINTMENT (OUTPATIENT)
Dept: ORTHOPEDIC SURGERY | Facility: CLINIC | Age: 27
End: 2022-06-30

## 2022-06-30 VITALS — WEIGHT: 160 LBS | HEIGHT: 63 IN | BODY MASS INDEX: 28.35 KG/M2

## 2022-06-30 DIAGNOSIS — M79.18 MYALGIA, OTHER SITE: ICD-10-CM

## 2022-06-30 PROCEDURE — 99213 OFFICE O/P EST LOW 20 MIN: CPT

## 2022-07-08 ENCOUNTER — NON-APPOINTMENT (OUTPATIENT)
Age: 27
End: 2022-07-08

## 2022-08-17 ENCOUNTER — APPOINTMENT (OUTPATIENT)
Dept: PAIN MANAGEMENT | Facility: CLINIC | Age: 27
End: 2022-08-17

## 2022-08-22 ENCOUNTER — NON-APPOINTMENT (OUTPATIENT)
Age: 27
End: 2022-08-22

## 2023-02-10 ENCOUNTER — OUTPATIENT (OUTPATIENT)
Dept: OUTPATIENT SERVICES | Facility: HOSPITAL | Age: 28
LOS: 1 days | End: 2023-02-10

## 2023-02-10 ENCOUNTER — RESULT REVIEW (OUTPATIENT)
Age: 28
End: 2023-02-10

## 2023-02-10 ENCOUNTER — NON-APPOINTMENT (OUTPATIENT)
Age: 28
End: 2023-02-10

## 2023-02-10 ENCOUNTER — APPOINTMENT (OUTPATIENT)
Dept: MRI IMAGING | Facility: CLINIC | Age: 28
End: 2023-02-10
Payer: SELF-PAY

## 2023-02-10 DIAGNOSIS — Z98.890 OTHER SPECIFIED POSTPROCEDURAL STATES: Chronic | ICD-10-CM

## 2023-02-10 PROCEDURE — 70544 MR ANGIOGRAPHY HEAD W/O DYE: CPT | Mod: 26

## 2023-02-10 PROCEDURE — 71046 X-RAY EXAM CHEST 2 VIEWS: CPT | Mod: 26

## 2023-02-10 PROCEDURE — 74018 RADEX ABDOMEN 1 VIEW: CPT | Mod: 26

## 2023-02-14 ENCOUNTER — NON-APPOINTMENT (OUTPATIENT)
Age: 28
End: 2023-02-14

## 2023-02-15 ENCOUNTER — NON-APPOINTMENT (OUTPATIENT)
Age: 28
End: 2023-02-15

## 2023-02-27 ENCOUNTER — APPOINTMENT (OUTPATIENT)
Dept: NEUROLOGY | Facility: CLINIC | Age: 28
End: 2023-02-27
Payer: MEDICAID

## 2023-02-27 VITALS
HEIGHT: 63 IN | WEIGHT: 185 LBS | DIASTOLIC BLOOD PRESSURE: 76 MMHG | SYSTOLIC BLOOD PRESSURE: 122 MMHG | HEART RATE: 93 BPM | BODY MASS INDEX: 32.78 KG/M2

## 2023-02-27 DIAGNOSIS — G45.9 TRANSIENT CEREBRAL ISCHEMIC ATTACK, UNSPECIFIED: ICD-10-CM

## 2023-02-27 DIAGNOSIS — G43.909 MIGRAINE, UNSPECIFIED, NOT INTRACTABLE, W/OUT STATUS MIGRAINOSUS: ICD-10-CM

## 2023-02-27 PROCEDURE — 99215 OFFICE O/P EST HI 40 MIN: CPT

## 2023-02-27 RX ORDER — RIVAROXABAN 20 MG/1
20 TABLET, FILM COATED ORAL
Refills: 0 | Status: ACTIVE | COMMUNITY

## 2023-02-27 NOTE — CONSULT LETTER
[Consult Letter:] : I had the pleasure of evaluating your patient, [unfilled]. [Please see my note below.] : Please see my note below. [Consult Closing:] : Thank you very much for allowing me to participate in the care of this patient.  If you have any questions, please do not hesitate to contact me. [Sincerely,] : Sincerely, [Dear  ___] : Dear  [unfilled], [FreeTextEntry2] : Josh Rendon [FreeTextEntry3] : Richard B. Libman, MD, FRCPC\par , Neurology \par Co-Director, Stroke Center\par Professor of Neurology\par [employer] at [facility]\par

## 2023-02-27 NOTE — PHYSICAL EXAM
[General Appearance - Alert] : alert [General Appearance - In No Acute Distress] : in no acute distress [Oriented To Time, Place, And Person] : oriented to person, place, and time [Impaired Insight] : insight and judgment were intact [Affect] : the affect was normal [Sclera] : the sclera and conjunctiva were normal [PERRL With Normal Accommodation] : pupils were equal in size, round, reactive to light, with normal accommodation [Extraocular Movements] : extraocular movements were intact [Outer Ear] : the ears and nose were normal in appearance [Oropharynx] : the oropharynx was normal [Neck Appearance] : the appearance of the neck was normal [Neck Cervical Mass (___cm)] : no neck mass was observed [Exaggerated Use Of Accessory Muscles For Inspiration] : no accessory muscle use [Heart Rate And Rhythm] : heart rate was normal and rhythm regular [Edema] : there was no peripheral edema [Abnormal Walk] : normal gait [Nail Clubbing] : no clubbing  or cyanosis of the fingernails [Involuntary Movements] : no involuntary movements were seen [Motor Tone] : muscle strength and tone were normal [Skin Color & Pigmentation] : normal skin color and pigmentation [Skin Turgor] : normal skin turgor [] : no rash [Skin Lesions] : no skin lesions [FreeTextEntry1] : \par Neurologic examination:\par \par Mental status:\par \par The patient is alert, attentive, and oriented. Speech is clear and fluent with good comprehension.\par \par Cranial nerves:\par \par CN II: Visual fields are full to confrontation. \par \par CN III, IV, VI: At primary gaze, there is no eye deviation.EOMI. No ptosis\par \par CN V: Facial sensation is intact bilaterally.\par \par CN VII: Face is symmetric with normal eye closure and smile.\par \par CN VII: Hearing is grossly intact\par \par CN IX, X: Palate elevates symmetrically. Phonation is normal.\par \par CN XI: Head turning and shoulder shrug are intact\par \par CN XII: Tongue is midline with normal movements and no atrophy.\par \par Motor:\par \par There is no pronator drift of out-stretched arms. Muscle bulk and tone are normal.  There was moderate giveway on the left with manual motor testing, but with encouragement, power was normal throughout.   Fine finger movements are intact. There are no abnormal or involuntary movements. \par \par Sensory:\par \par Light touch intact in fingers and toes. No extinction.\par \par Coordination:\par \par There is no dysmetria on finger-to-nose.\par \par Reflexes: 1+ throughout, trace at the achilles. Plantar response is downgoing.\par \par Gait/Stance: Within normal limits\par

## 2023-02-27 NOTE — HISTORY OF PRESENT ILLNESS
[FreeTextEntry1] : Jeniffer Lam is a 26 y/o lady\par To summarize her history:\par -In 3/21 she had an episode  of sudden vision loss in right eye, accompanied by weakness on her left side; the entire episode lasted approximately 20 minutes\par - Two days after this event (3/21), she had a syncopal event and then experienced the vision loss in right eye and left sided weakness for 20 minutes. She presented to [facility] following this event,  and neuroimaging was reportedly negative. 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 A loop recorder was implanted, and she had an extensive cardiac workup which she reports was unremarkable.\par - On 10/26/21  she was advised to go to the hospital by her cardiologist due to 30 minutes of tachy arrhythmia detected on ILR, she reports at the time her O2 saturation was in the high 80s to low 90s and she had some SOB. She was started on a steroid taper, and symptoms resolved.\par - In 12/21 she experienced 4-5  episodes at work where her coworkers report she looks like she is "spacing out". She doesn't have any recollection of the episodes\par - On 12/14/21 she had a "spacing out" episode which led to transient loss of consciousness.\par -  1/28/22-1/31/22 Admitted to EMU at [facility]: EEG revealed mild generalized slowing. No epileptiform pattern or seizures seen.\par - On 3/10/22 transient binocular vision loss. \par \par She was evaluated by KUN Black and KUN Mccullough on 11/24/21 and 1/3/22 for above symptoms.\par \par 4/4/22\par She presents to the office today wearing a cervical collar due to injury she sustained following a fall on 3/10/22. She has been experiencing intermittent left leg numbness. On 2/9/22- she had allergic angioedema following covid booster\par \par \par MRI brain (3/31/2022) to my eye was normal.\par MRA neck and head (3/31/2022) to my eye was unremarkable.\par \par MRI brain/MRA neck and head (3/31/2022) were normal. \par \par 2/27/23\par She presents to the office today. By her report, since her prior visit, she has had right sided brachial plexus decompressive surgery and had a total thyroidectomy and partial parathyroidectomy, as well as some type of surgery on a ligament in her left forearm. 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

## 2023-02-27 NOTE — DISCUSSION/SUMMARY
[FreeTextEntry1] : Impression: The explanation for her episodes, including visual loss, numbness, weakness, loss of awareness or consciousness etc. remains uncertain, but she can be reassured that there is no evidence of stroke.  Seizures seem unlikely in view of several days of video EEG monitoring which showed no epileptiform abnormalities.  Migraine, perhaps with brainstem features  remains a possibility, but uncertain.\par \par Overall she is neurologically intact. Her workup thus far has been negative (including EEG, carotid dopplers, MRI/MRA, ILR). I suspect her symptoms may be attributed to migraine with brainstem features. I recommend she be evaluated by Dr. Danny Caceres (migraine specialist). She has been taking Riboflavin 300-500mg daily, and I have recommended she begin taking Coenzyme Q10 300mg daily for migraine prophylaxis.\par \par She has been wearing a hard cervical collar since a fall several weeks ago, as suggested by the ED at [facility].  It's unclear to me whether this collar is providing any benefit.  She should follow up with neurosurgery for further guidance regarding her cervical spine. She has been provided with a referral by Dr. Zafar.\par \par We did not explore the possible contribution of psychosocial stress to her symptoms.  If symptoms do not improve with migraine prophylaxis, then this may be an important issue to pursue.\par \par She should continue to follow up with Dr. Goldberg to manage general medical concerns. 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 Most likely, this represents normal anatomy (e.g. an infundibulum) but a very small aneurysm could not be ruled out. An actual aneurysm of this size has an extremely low risk of rupture so no treatment would be indicated in any case. She should have repeat MRA brain in 1 year to monitor this finding for stability. \par - She has been taking Xarelto since August 2022 for for what she called superficial phlebitis (or was it perhaps a DVT?). I will defer to your judgement as to when she can complete her course of Xarelto. \par \par She can follow up in approximately one year, after repeat MRA. I hope she remains free of serious trouble.

## 2023-02-27 NOTE — REVIEW OF SYSTEMS
[Anxiety] : anxiety [As Noted in HPI] : as noted in HPI [Shortness Of Breath] : shortness of breath [Negative] : Heme/Lymph [FreeTextEntry6] : Follows with pulmonology- currently tapering off prednisone. When very SOB feels numbness and tingling in both legs, when stops activity numbness goes away [de-identified] : She saw hematology in November 2021, and was told she was severely anemic. She has since begun iron therapy 650mg daily; has not had any shortness of breath, numbness, or tingling since beginning iron therapy. Her hematologist felt that her degree of anemia was unlikely to cause this extent of symptoms. The cause of the anemia remains unknown. She is in the process of scheduling appointment with gastroenterology.   [FreeTextEntry1] : Reports allergy to covid vaccine (received in 6/21)- dizziness a few minutes after, hives 4 hours later, treated with prednisone aftre both doses.

## 2023-04-03 ENCOUNTER — APPOINTMENT (OUTPATIENT)
Dept: MRI IMAGING | Facility: CLINIC | Age: 28
End: 2023-04-03
Payer: SELF-PAY

## 2023-04-03 ENCOUNTER — OUTPATIENT (OUTPATIENT)
Dept: OUTPATIENT SERVICES | Facility: HOSPITAL | Age: 28
LOS: 1 days | End: 2023-04-03

## 2023-04-03 DIAGNOSIS — Z98.890 OTHER SPECIFIED POSTPROCEDURAL STATES: Chronic | ICD-10-CM

## 2023-04-03 PROCEDURE — 72158 MRI LUMBAR SPINE W/O & W/DYE: CPT | Mod: 26

## 2023-05-11 ENCOUNTER — EMERGENCY (EMERGENCY)
Facility: HOSPITAL | Age: 28
LOS: 1 days | Discharge: LEFT BEFORE DISCHARGE | End: 2023-05-11
Payer: SELF-PAY

## 2023-05-11 DIAGNOSIS — Z98.890 OTHER SPECIFIED POSTPROCEDURAL STATES: Chronic | ICD-10-CM

## 2023-05-11 PROCEDURE — L9992: CPT

## 2023-05-11 NOTE — ED ADULT NURSE REASSESSMENT NOTE - NS ED NURSE REASSESS COMMENT FT1
was called at 8pm not available , registration states pt left can't wait, contact phone number called at 910pm  pt states left and went to another hospital  because she can't wait with too many people and having pain.

## 2023-08-29 ENCOUNTER — APPOINTMENT (OUTPATIENT)
Dept: ORTHOPEDIC SURGERY | Facility: CLINIC | Age: 28
End: 2023-08-29

## 2024-01-02 ENCOUNTER — APPOINTMENT (OUTPATIENT)
Dept: NEUROSURGERY | Facility: CLINIC | Age: 29
End: 2024-01-02
Payer: MEDICAID

## 2024-01-02 VITALS
BODY MASS INDEX: 32.78 KG/M2 | TEMPERATURE: 98.6 F | HEIGHT: 63 IN | SYSTOLIC BLOOD PRESSURE: 124 MMHG | OXYGEN SATURATION: 98 % | DIASTOLIC BLOOD PRESSURE: 74 MMHG | HEART RATE: 90 BPM | WEIGHT: 185 LBS | RESPIRATION RATE: 18 BRPM

## 2024-01-02 PROCEDURE — 99204 OFFICE O/P NEW MOD 45 MIN: CPT

## 2024-02-12 NOTE — REVIEW OF SYSTEMS
[Anxiety] : anxiety [As Noted in HPI] : as noted in HPI [Shortness Of Breath] : shortness of breath [Negative] : Heme/Lymph [FreeTextEntry6] : Follows with pulmonology- currently tapering off prednisone. When very SOB feels numbness and tingling in both legs, when stops activity numbness goes away [de-identified] : She saw hematology in November 2021, and was told she was severely anemic. She has since begun iron therapy 650mg daily; has not had any shortness of breath, numbness, or tingling since beginning iron therapy. Her hematologist felt that her degree of anemia was unlikely to cause this extent of symptoms. The cause of the anemia remains unknown. She is in the process of scheduling appointment with gastroenterology.   [FreeTextEntry1] : Reports allergy to covid vaccine (received in 6/21)- dizziness a few minutes after, hives 4 hours later, treated with prednisone aftre both doses.

## 2024-02-12 NOTE — PHYSICAL EXAM
[FreeTextEntry1] : \par  Neurologic examination:\par  \par  Mental status:\par  \par  The patient is alert, attentive, and oriented. Speech is clear and fluent with good comprehension.\par  \par  Cranial nerves:\par  \par  CN II: Visual fields are full to confrontation. \par  \par  CN III, IV, VI: At primary gaze, there is no eye deviation.EOMI. No ptosis\par  \par  CN V: Facial sensation is intact bilaterally.\par  \par  CN VII: Face is symmetric with normal eye closure and smile.\par  \par  CN VII: Hearing is grossly intact\par  \par  CN IX, X: Palate elevates symmetrically. Phonation is normal.\par  \par  CN XI: Head turning and shoulder shrug are intact\par  \par  CN XII: Tongue is midline with normal movements and no atrophy.\par  \par  Motor:\par  \par  There is no pronator drift of out-stretched arms. Muscle bulk and tone are normal.  There was moderate giveway on the left with manual motor testing, but with encouragement, power was normal throughout.   Fine finger movements are intact. There are no abnormal or involuntary movements. \par  \par  Sensory:\par  \par  Light touch intact in fingers and toes. No extinction.\par  \par  Coordination:\par  \par  There is no dysmetria on finger-to-nose.\par  \par  Reflexes: 1+ throughout, trace at the achilles. Plantar response is downgoing.\par  \par  Gait/Stance: Within normal limits\par   [General Appearance - Alert] : alert [General Appearance - In No Acute Distress] : in no acute distress [Oriented To Time, Place, And Person] : oriented to person, place, and time [Impaired Insight] : insight and judgment were intact [Affect] : the affect was normal [Sclera] : the sclera and conjunctiva were normal [PERRL With Normal Accommodation] : pupils were equal in size, round, reactive to light, with normal accommodation [Extraocular Movements] : extraocular movements were intact [Outer Ear] : the ears and nose were normal in appearance [Oropharynx] : the oropharynx was normal [Neck Appearance] : the appearance of the neck was normal [Neck Cervical Mass (___cm)] : no neck mass was observed [Exaggerated Use Of Accessory Muscles For Inspiration] : no accessory muscle use [Heart Rate And Rhythm] : heart rate was normal and rhythm regular [Edema] : there was no peripheral edema [Abnormal Walk] : normal gait [Nail Clubbing] : no clubbing  or cyanosis of the fingernails [Involuntary Movements] : no involuntary movements were seen [Motor Tone] : muscle strength and tone were normal [Skin Color & Pigmentation] : normal skin color and pigmentation [Skin Turgor] : normal skin turgor [] : no rash [Skin Lesions] : no skin lesions

## 2024-02-12 NOTE — HISTORY OF PRESENT ILLNESS
[FreeTextEntry1] : Jeniffer Lam is a 27 y/o lady To summarize her history: -In 3/21 she had an episode  of sudden vision loss in right eye, accompanied by weakness on her left side; the entire episode lasted approximately 20 minutes - Two days after this event (3/21), she had a syncopal event and then experienced the vision loss in right eye and left sided weakness for 20 minutes. She presented to [facility] following this event,  and neuroimaging was reportedly negative.  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 A loop recorder was implanted, and she had an extensive cardiac workup which she reports was unremarkable. - On 10/26/21  she was advised to go to the hospital by her cardiologist due to 30 minutes of tachy arrhythmia detected on ILR, she reports at the time her O2 saturation was in the high 80s to low 90s and she had some SOB. She was started on a steroid taper, and symptoms resolved. - In 12/21 she experienced 4-5  episodes at work where her coworkers report she looks like she is "spacing out". She doesn't have any recollection of the episodes - On 12/14/21 she had a "spacing out" episode which led to transient loss of consciousness. -  1/28/22-1/31/22 Admitted to EMU at [facility]: EEG revealed mild generalized slowing. No epileptiform pattern or seizures seen. - On 3/10/22 transient binocular vision loss.   She was evaluated by KUN Black and KUN Mccullough on 11/24/21 and 1/3/22 for above symptoms.  4/4/22 She presents to the office today wearing a cervical collar due to injury she sustained following a fall on 3/10/22. She has been experiencing intermittent left leg numbness. On 2/9/22- she had allergic angioedema following covid booster   MRI brain (3/31/2022) to my eye was normal. MRA neck and head (3/31/2022) to my eye was unremarkable.  MRI brain/MRA neck and head (3/31/2022) were normal.   2/27/23 She presents to the office today. By her report, since her prior visit, she has had right sided brachial plexus decompressive surgery and had a total thyroidectomy and partial parathyroidectomy, as well as some type of surgery on a ligament in her left forearm. She developed what she referred to as superficial phlebitis in August  and has been on Xarelto since.  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     Rheumatologist Dulce Erazo  PCP Dr Josh Rendon  Cardiology- Dr Tapia Hematology- Dr Aura Page

## 2024-02-12 NOTE — DISCUSSION/SUMMARY
January 14, 2022     Patient: Daphnie Mcclain   YOB: 1969   Date of Visit: 1/14/2022       To Whom it May Concern:    Daphnie Mcclain was seen in my clinic on 1/14/2022 at 12:15 pm.    Please excuse Daphnie for her absence from work 1/3/2022- 1/4/2022.    Sincerely,       Flaquito Cohen, DO    Medical information is confidential and cannot be disclosed without the written consent of the patient or her representative.       [FreeTextEntry1] : Impression: The explanation for her episodes, including visual loss, numbness, weakness, loss of awareness or consciousness etc. remains uncertain, but she can be reassured that there is no evidence of stroke.  Seizures seem unlikely in view of several days of video EEG monitoring which showed no epileptiform abnormalities.  Migraine, perhaps with brainstem features  remains a possibility, but uncertain.\par  \par  Overall she is neurologically intact. Her workup thus far has been negative (including EEG, carotid dopplers, MRI/MRA, ILR). I suspect her symptoms may be attributed to migraine with brainstem features. I recommend she be evaluated by Dr. Danny Caceres (migraine specialist). She has been taking Riboflavin 300-500mg daily, and I have recommended she begin taking Coenzyme Q10 300mg daily for migraine prophylaxis.\par  \par  She has been wearing a hard cervical collar since a fall several weeks ago, as suggested by the ED at [facility].  It's unclear to me whether this collar is providing any benefit.  She should follow up with neurosurgery for further guidance regarding her cervical spine. She has been provided with a referral by Dr. Zafar.\par  \par  We did not explore the possible contribution of psychosocial stress to her symptoms.  If symptoms do not improve with migraine prophylaxis, then this may be an important issue to pursue.\par  \par  She should continue to follow up with Dr. Goldberg to manage general medical concerns. 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 Most likely, this represents normal anatomy (e.g. an infundibulum) but a very small aneurysm could not be ruled out. An actual aneurysm of this size has an extremely low risk of rupture so no treatment would be indicated in any case. She should have repeat MRA brain in 1 year to monitor this finding for stability. \par  - She has been taking Xarelto since August 2022 for for what she called superficial phlebitis (or was it perhaps a DVT?). I will defer to your judgement as to when she can complete her course of Xarelto. \par  \par  She can follow up in approximately one year, after repeat MRA. I hope she remains free of serious trouble.

## 2024-02-12 NOTE — CONSULT LETTER
[Dear  ___] : Dear  [unfilled], [Consult Letter:] : I had the pleasure of evaluating your patient, [unfilled]. [Please see my note below.] : Please see my note below. [Consult Closing:] : Thank you very much for allowing me to participate in the care of this patient.  If you have any questions, please do not hesitate to contact me. [Sincerely,] : Sincerely, [FreeTextEntry2] : Josh Rendon [FreeTextEntry3] : Richard B. Libman, MD, FRCPC\par  , Neurology \par  Co-Director, Stroke Center\par  Professor of Neurology\par  [employer] [employer] at [facility]\par

## 2024-03-12 ENCOUNTER — APPOINTMENT (OUTPATIENT)
Dept: NEUROLOGY | Facility: CLINIC | Age: 29
End: 2024-03-12

## 2024-11-18 ENCOUNTER — EMERGENCY (EMERGENCY)
Facility: HOSPITAL | Age: 29
LOS: 1 days | Discharge: HOME | End: 2024-11-18
Attending: EMERGENCY MEDICINE
Payer: MEDICAID

## 2024-11-18 VITALS
SYSTOLIC BLOOD PRESSURE: 142 MMHG | OXYGEN SATURATION: 100 % | HEIGHT: 65 IN | TEMPERATURE: 98 F | RESPIRATION RATE: 18 BRPM | HEART RATE: 127 BPM | WEIGHT: 180.78 LBS | DIASTOLIC BLOOD PRESSURE: 83 MMHG

## 2024-11-18 VITALS
RESPIRATION RATE: 18 BRPM | HEART RATE: 91 BPM | DIASTOLIC BLOOD PRESSURE: 93 MMHG | OXYGEN SATURATION: 98 % | TEMPERATURE: 98 F | SYSTOLIC BLOOD PRESSURE: 141 MMHG

## 2024-11-18 DIAGNOSIS — Z98.890 OTHER SPECIFIED POSTPROCEDURAL STATES: Chronic | ICD-10-CM

## 2024-11-18 PROCEDURE — 96361 HYDRATE IV INFUSION ADD-ON: CPT

## 2024-11-18 PROCEDURE — 93005 ELECTROCARDIOGRAM TRACING: CPT

## 2024-11-18 PROCEDURE — 99284 EMERGENCY DEPT VISIT MOD MDM: CPT

## 2024-11-18 PROCEDURE — 99284 EMERGENCY DEPT VISIT MOD MDM: CPT | Mod: 25

## 2024-11-18 PROCEDURE — 96375 TX/PRO/DX INJ NEW DRUG ADDON: CPT

## 2024-11-18 PROCEDURE — 96374 THER/PROPH/DIAG INJ IV PUSH: CPT

## 2024-11-18 RX ORDER — FAMOTIDINE 10 MG/ML
20 INJECTION INTRAVENOUS ONCE
Refills: 0 | Status: DISCONTINUED | OUTPATIENT
Start: 2024-11-18 | End: 2024-11-18

## 2024-11-18 RX ORDER — EPINEPHRINE 1 MG/ML
0.3 INJECTION INTRAMUSCULAR; INTRAVENOUS; SUBCUTANEOUS
Qty: 2 | Refills: 0
Start: 2024-11-18

## 2024-11-18 RX ORDER — FAMOTIDINE 10 MG/ML
20 INJECTION INTRAVENOUS ONCE
Refills: 0 | Status: COMPLETED | OUTPATIENT
Start: 2024-11-18 | End: 2024-11-18

## 2024-11-18 RX ORDER — DIPHENHYDRAMINE HCL 12.5MG/5ML
25 ELIXIR ORAL ONCE
Refills: 0 | Status: DISCONTINUED | OUTPATIENT
Start: 2024-11-18 | End: 2024-11-18

## 2024-11-18 RX ORDER — DIPHENHYDRAMINE HCL 12.5MG/5ML
25 ELIXIR ORAL ONCE
Refills: 0 | Status: COMPLETED | OUTPATIENT
Start: 2024-11-18 | End: 2024-11-18

## 2024-11-18 RX ORDER — METHYLPREDNISOLONE ACETATE 80 MG/ML
125 INJECTION, SUSPENSION INTRALESIONAL; INTRAMUSCULAR; INTRASYNOVIAL; SOFT TISSUE ONCE
Refills: 0 | Status: DISCONTINUED | OUTPATIENT
Start: 2024-11-18 | End: 2024-11-18

## 2024-11-18 RX ORDER — LORATADINE 10 MG/1
1 TABLET ORAL
Qty: 6 | Refills: 0
Start: 2024-11-18 | End: 2024-11-20

## 2024-11-18 RX ORDER — SODIUM CHLORIDE 9 MG/ML
1000 INJECTION, SOLUTION INTRAMUSCULAR; INTRAVENOUS; SUBCUTANEOUS ONCE
Refills: 0 | Status: COMPLETED | OUTPATIENT
Start: 2024-11-18 | End: 2024-11-18

## 2024-11-18 RX ADMIN — SODIUM CHLORIDE 1000 MILLILITER(S): 9 INJECTION, SOLUTION INTRAMUSCULAR; INTRAVENOUS; SUBCUTANEOUS at 14:53

## 2024-11-18 RX ADMIN — FAMOTIDINE 20 MILLIGRAM(S): 10 INJECTION INTRAVENOUS at 15:35

## 2024-11-18 RX ADMIN — Medication 25 MILLIGRAM(S): at 15:35

## 2024-11-18 RX ADMIN — SODIUM CHLORIDE 1000 MILLILITER(S): 9 INJECTION, SOLUTION INTRAMUSCULAR; INTRAVENOUS; SUBCUTANEOUS at 16:00

## 2024-11-18 NOTE — ED PROVIDER NOTE - PHYSICAL EXAMINATION
General: Patient well appearing, tachycardic  HEENT: MMM, trachea midline  Respiratory: No respiratory distress, CTAB  Neuro: Moves all extremities  GI: Soft, nontender  Skin: No rashes or lesions

## 2024-11-18 NOTE — ED PROVIDER NOTE - PATIENT PORTAL LINK FT
You can access the FollowMyHealth Patient Portal offered by [facility] by registering at the following website: [email or web address]. By joining [facility]’s FollowMyHealth portal, you will also be able to view your health information using other applications (apps) compatible with our system.

## 2024-11-18 NOTE — ED ADULT NURSE NOTE - OBJECTIVE STATEMENT
I went out and eat in a restaurant last Saturday and ate french fries covered with flour and tomatoes and state she's allergic to it went to ER in [facility] and was medicated, today state feels like throat closing which she gave herself an epi pen injection

## 2024-11-18 NOTE — ED ADULT TRIAGE NOTE - CHIEF COMPLAINT QUOTE
SOB, and allergic reaction to food from restaurant  since saturday. Took EPI pen this morning. SOB, and allergic reaction to food from restaurant since saturday. Seen in [facility] and treated with medications. Pt reports symptoms are coming back today and gave herself EPI pen this morning around 0900. Reports tachycardia and throat swelling today.

## 2024-11-18 NOTE — ED ADULT NURSE NOTE - CHIEF COMPLAINT QUOTE
SOB, and allergic reaction to food from restaurant since saturday. Seen in [facility] and treated with medications. Pt reports symptoms are coming back today and gave herself EPI pen this morning around 0900. Reports tachycardia and throat swelling today.

## 2024-11-18 NOTE — ED PROVIDER NOTE - NSFOLLOWUPINSTRUCTIONS_ED_ALL_ED_FT
You were evaluated after allergic reaction and received several medications.  Use prescribed medications as indicated.

## 2024-11-18 NOTE — ED PROVIDER NOTE - OBJECTIVE STATEMENT
29-year-old female with multiple allergies who had an allergic reaction/anaphylaxis on Saturday after eating French fries covered in flour and tomatoes for which she is allergic to.  She was treated at outside hospital.  She presents again today with feeling of throat tightening.  She took epinephrine at 9 AM.  Denies shortness of breath, wheezing, vomiting, abdominal pain

## 2024-11-18 NOTE — ED PROVIDER NOTE - CLINICAL SUMMARY MEDICAL DECISION MAKING FREE TEXT BOX
29-year-old female who had allergic reaction over the weekend and presents feeling as if the reaction is coming back with throat closure.  She took epinephrine prior to arrival.  Benign exam.  No rash, abdominal pain, wheezing.  Received fluids, Solu-Medrol, Pepcid, Benadryl.  Will discharge with antihistamine and steroids as well as EpiPen.